# Patient Record
Sex: FEMALE | Race: WHITE | NOT HISPANIC OR LATINO | URBAN - METROPOLITAN AREA
[De-identification: names, ages, dates, MRNs, and addresses within clinical notes are randomized per-mention and may not be internally consistent; named-entity substitution may affect disease eponyms.]

---

## 2023-05-26 ENCOUNTER — APPOINTMENT (OUTPATIENT)
Dept: PRIMARY CARE | Facility: CLINIC | Age: 28
End: 2023-05-26
Payer: COMMERCIAL

## 2023-11-02 ENCOUNTER — TELEMEDICINE (OUTPATIENT)
Dept: PRIMARY CARE | Facility: CLINIC | Age: 28
End: 2023-11-02
Payer: COMMERCIAL

## 2023-11-02 DIAGNOSIS — U07.1 COVID: Primary | ICD-10-CM

## 2023-11-02 PROCEDURE — 99213 OFFICE O/P EST LOW 20 MIN: CPT | Performed by: FAMILY MEDICINE

## 2023-11-02 RX ORDER — BENZONATATE 100 MG/1
100 CAPSULE ORAL 3 TIMES DAILY PRN
Qty: 20 CAPSULE | Refills: 0 | Status: SHIPPED | OUTPATIENT
Start: 2023-11-02 | End: 2023-11-09

## 2023-11-02 NOTE — LETTER
November 2, 2023    Love MEYER Naty  [address]  [place] [address]      Dear Ms. Naty:          November 2, 2023      To Whom It May Concern:    Our patient, Love Alfonso, has been under our care and will need to be excused from 11/2/2023 to 11/7/2023    Their return to work date is:  11/8/2023      Sincerely,      Kellen Chavez DO    11/2/23                     Kellen Chavez DO        CC: No Recipients

## 2023-11-02 NOTE — PROGRESS NOTES
Assessment     ASSESSMENT/PLAN:      Problem List Items Addressed This Visit    None  Visit Diagnoses       COVID    -  Primary    Relevant Medications    benzonatate (Tessalon Perles) 100 mg capsule              Patient Instructions:  Patient Instructions   Patient does not have risk factors that would warrant treatment with paxlovid. Recommend supportive care. Try tessalon pearls for cough., Use NASIDs for pain.       Patient consented to virtual encounter. If patient was felt to need in-person evaluation they were directed to the office or ED as appropriate. Total time spent interacting with patient was [15] minutes.     Patient presents today via video telemedicine encounter. This encounter is not taking place in person due to COVID.    Signed by: Kellen Chavez DO       FUTURE DIRECTION:   ]    Subjective     SUBJECTIVE:     HPI : Patient is a 28 y.o. female who presents today via video telemedicine encounter to discuss the following:    Started yesterday with Sore throat congestion and cough , myalgia, subjective fever  Denies sick contact, but recently traveled via car to [place]   Denies chest pain or issues with breathing   Home COVID test was positive     Review of Systems  Negative unless stated above     Objective   OBJECTIVE:     There were no vitals filed for this visit.    Physical Exam  Constitutional:       Appearance: Normal appearance.   HENT:      Head: Normocephalic.   Pulmonary:      Effort: Pulmonary effort is normal.   Musculoskeletal:      Cervical back: Normal range of motion.   Neurological:      Mental Status: She is alert.   Psychiatric:         Mood and Affect: Mood normal.

## 2023-11-03 NOTE — PATIENT INSTRUCTIONS
Patient does not have risk factors that would warrant treatment with paxlovid. Recommend supportive care. Try tessalon pearls for cough., Use NASIDs for pain.

## 2024-03-06 ENCOUNTER — OFFICE VISIT (OUTPATIENT)
Dept: PRIMARY CARE | Facility: CLINIC | Age: 29
End: 2024-03-06
Payer: COMMERCIAL

## 2024-03-06 VITALS
DIASTOLIC BLOOD PRESSURE: 70 MMHG | OXYGEN SATURATION: 99 % | SYSTOLIC BLOOD PRESSURE: 102 MMHG | HEART RATE: 76 BPM | TEMPERATURE: 97.1 F | WEIGHT: 151 LBS

## 2024-03-06 DIAGNOSIS — M54.16 LUMBAR BACK PAIN WITH RADICULOPATHY AFFECTING RIGHT LOWER EXTREMITY: Primary | ICD-10-CM

## 2024-03-06 PROCEDURE — 99214 OFFICE O/P EST MOD 30 MIN: CPT | Performed by: FAMILY MEDICINE

## 2024-03-06 PROCEDURE — 1036F TOBACCO NON-USER: CPT | Performed by: FAMILY MEDICINE

## 2024-03-06 RX ORDER — NAPROXEN 500 MG/1
500 TABLET ORAL
Qty: 10 TABLET | Refills: 0 | Status: SHIPPED | OUTPATIENT
Start: 2024-03-06 | End: 2024-03-11

## 2024-03-06 RX ORDER — DROSPIRENONE AND ETHINYL ESTRADIOL 0.03MG-3MG
1 KIT ORAL
COMMUNITY
Start: 2016-12-19

## 2024-03-06 RX ORDER — SERTRALINE HYDROCHLORIDE 50 MG/1
125 TABLET, FILM COATED ORAL DAILY
COMMUNITY
Start: 2023-05-10

## 2024-03-06 RX ORDER — POLYETHYLENE GLYCOL 3350 17 G/17G
POWDER, FOR SOLUTION ORAL
COMMUNITY
Start: 2012-05-07

## 2024-03-06 RX ORDER — CETIRIZINE HYDROCHLORIDE 10 MG/1
10 TABLET ORAL DAILY
COMMUNITY

## 2024-03-06 RX ORDER — CYCLOBENZAPRINE HCL 5 MG
5 TABLET ORAL NIGHTLY PRN
Qty: 10 TABLET | Refills: 0 | Status: SHIPPED | OUTPATIENT
Start: 2024-03-06 | End: 2024-05-05

## 2024-03-06 ASSESSMENT — ENCOUNTER SYMPTOMS
NUMBNESS: 0
WEAKNESS: 0
BACK PAIN: 1

## 2024-03-06 NOTE — PROGRESS NOTES
"Subjective   Patient ID: Love Alfonso is a 29 y.o. female who presents for Back Pain (Lower back pain worsening x \" a while\" but severe x 1 day. NKI).    Love presents with low back pain. Has been a chronic issue but worse over last day. No accident or injury. Pain on right side. Radiates into right buttocks and leg. No weakness in leg. Has been doing stretches at home with limited improvement.          Review of Systems   Musculoskeletal:  Positive for back pain.   Neurological:  Negative for weakness and numbness.       Objective   /70   Pulse 76   Temp 36.2 °C (97.1 °F)   Wt 68.5 kg (151 lb)   SpO2 99%     Physical Exam  Constitutional:       General: She is not in acute distress.     Appearance: Normal appearance.   HENT:      Head: Normocephalic.   Pulmonary:      Effort: Pulmonary effort is normal.   Musculoskeletal:      Lumbar back: Tenderness (right QL) present. No swelling, deformity or bony tenderness. Negative right straight leg raise test and negative left straight leg raise test.   Skin:     General: Skin is warm and dry.   Neurological:      General: No focal deficit present.      Mental Status: She is alert.      Comments: +5/5 strength of bilateral lower extremities   Psychiatric:         Mood and Affect: Mood normal.         Assessment/Plan   Diagnoses and all orders for this visit:  Lumbar back pain with radiculopathy affecting right lower extremity  Comments:  Check XR. Refer to PT. Start cyclobenzaprine and naproxen.  Orders:  -     XR lumbar spine 2-3 views; Future  -     cyclobenzaprine (Flexeril) 5 mg tablet; Take 1 tablet (5 mg) by mouth as needed at bedtime for muscle spasms.  -     naproxen (Naprosyn) 500 mg tablet; Take 1 tablet (500 mg) by mouth 2 times a day with meals for 5 days.  -     Referral to Physical Therapy; Future         "

## 2024-03-20 ENCOUNTER — HOSPITAL ENCOUNTER (OUTPATIENT)
Dept: RADIOLOGY | Facility: HOSPITAL | Age: 29
Discharge: HOME | End: 2024-03-20
Payer: COMMERCIAL

## 2024-03-20 ENCOUNTER — EVALUATION (OUTPATIENT)
Dept: PHYSICAL THERAPY | Facility: HOSPITAL | Age: 29
End: 2024-03-20
Payer: COMMERCIAL

## 2024-03-20 DIAGNOSIS — M54.16 LUMBAR BACK PAIN WITH RADICULOPATHY AFFECTING RIGHT LOWER EXTREMITY: Primary | ICD-10-CM

## 2024-03-20 DIAGNOSIS — M54.16 LUMBAR BACK PAIN WITH RADICULOPATHY AFFECTING RIGHT LOWER EXTREMITY: ICD-10-CM

## 2024-03-20 PROCEDURE — 72100 X-RAY EXAM L-S SPINE 2/3 VWS: CPT

## 2024-03-20 PROCEDURE — 72100 X-RAY EXAM L-S SPINE 2/3 VWS: CPT | Performed by: RADIOLOGY

## 2024-03-20 PROCEDURE — 97110 THERAPEUTIC EXERCISES: CPT | Mod: GP | Performed by: PHYSICAL THERAPIST

## 2024-03-20 PROCEDURE — 97161 PT EVAL LOW COMPLEX 20 MIN: CPT | Mod: GP | Performed by: PHYSICAL THERAPIST

## 2024-03-20 ASSESSMENT — PATIENT HEALTH QUESTIONNAIRE - PHQ9
1. LITTLE INTEREST OR PLEASURE IN DOING THINGS: SEVERAL DAYS
10. IF YOU CHECKED OFF ANY PROBLEMS, HOW DIFFICULT HAVE THESE PROBLEMS MADE IT FOR YOU TO DO YOUR WORK, TAKE CARE OF THINGS AT HOME, OR GET ALONG WITH OTHER PEOPLE: NOT DIFFICULT AT ALL
2. FEELING DOWN, DEPRESSED OR HOPELESS: NOT AT ALL
SUM OF ALL RESPONSES TO PHQ9 QUESTIONS 1 AND 2: 1

## 2024-03-20 ASSESSMENT — PAIN - FUNCTIONAL ASSESSMENT: PAIN_FUNCTIONAL_ASSESSMENT: 0-10

## 2024-03-20 ASSESSMENT — PAIN SCALES - GENERAL: PAINLEVEL_OUTOF10: 4

## 2024-03-20 ASSESSMENT — PAIN DESCRIPTION - DESCRIPTORS: DESCRIPTORS: ACHING

## 2024-03-20 ASSESSMENT — ENCOUNTER SYMPTOMS
DEPRESSION: 1
OCCASIONAL FEELINGS OF UNSTEADINESS: 0
LOSS OF SENSATION IN FEET: 0

## 2024-03-20 NOTE — PROGRESS NOTES
Physical Therapy    Physical Therapy Evaluation    Patient Name: Love Alfonso  MRN: [id number]  Today's Date: 3/20/2024  Time Calculation  Start Time: 1030  Stop Time: 1130  Time Calculation (min): 60 min  Visit # 1  PT Evaluation Time Entry  PT Evaluation (Low) Time Entry: 50  PT Therapeutic Procedures Time Entry  Therapeutic Exercise Time Entry: 10               Assessment  Pt presents with low back pain, intermittent LE radiculopathy, decreased strength and flexibility in LE's and trunk and would benefit from continued skilled PT services.      Plan  continue 2x per week x 4 weeks for   Treatment/Interventions: Cryotherapy, Education/ Instruction, Electrical stimulation, Hot pack, Manual therapy, Mechanical traction, Neuromuscular re-education, Self care/ home management, Therapeutic activities, Therapeutic exercises, body mechanics education and postural re education    Current Problem  1. Lumbar back pain with radiculopathy affecting right lower extremity  Referral to Physical Therapy    Follow Up In Physical Therapy    Check XR. Refer to PT. Start cyclobenzaprine and naproxen.        Subjective  Insidiuous onset, about 2 years ago.has back pain that is constant but varies in intensity.  Beginning of March developed right leg pain, posterior to back of knee. No specidiv incident, was disrupting sleep, No weakness, no change in bowel or bladder habits. Went to PCP, she was given Excedrin, muscle relaxants but did not take. She does Yoga a couple times a week. Instense leg pain subsided a week later. She is back to low grade back, Constant movemnt and always uncomfortable. Walking tolerance is about 5 mins before pain increases. Standing tolerance limited, any prolong positioning.   Pt has history of IBS, ocular migraines, depression. She is being followed for this and depression is controlled with meds.   Precautions:  Precautions  STEADI Fall Risk Score (The score of 4 or more indicates an increased risk of  falling): 2  Precautions Comment: none  Pain:  Pain Assessment: 0-10  Pain Score: 4  Pain Location: Back  Pain Descriptors: Aching  Home Living  lives alone     Prior Function Per Pt/Caregiver Report:  works full time as a , not much lifting, 80 percent of job is sitting. She does have a standing desk.      Objective pt presents with hyperextension noted at elbows, knees, forward head and rounded shoulder posture     Range of Motion: FF EFL but pain end range, extension 50% at end range across mid low back, right side bending 50% and pain right low back, left side bending 40% and pain right low back,.       Strength: Bilateral LE are 5/5 major muscle groups except for hip abd are 4+/5      Palpation: tender bilateral L4-5 paraspinals, right worse than left, right piriformis, also very tender bilateral Upper traps, sub occipital muscles     Special Tests:    Seated and supine SLR tests are negative  Gait: WNL     Balance:  WFL     Stairs: reciprocal     Outcome Measures:  Other Measures  Oswestry Disablity Index (RENATE): 18%     OP EDUCATION: reviewed proper sitting posture, instructed in calf and hamstring stretches, discussed ergonomic set up of work station  EXERCISES       Date 3/20/2024        VISIT# # 1 # # #    REPS REPS REPS REPS   Seated hamstring stretch, HEP             Nu Step next      Treadmill              Swiss Ball     DKTC                           LTR                           Bridges                          Piriformis stretch              Shuttle    DLP                       SLP                       Heel raise/calf stretch       Clamshells       Planks       Parallel Bars          standing hip abd                                     Slow high knee march                                     Squats                                     Lunge                                                                                           HEP              Goals:  Active       PT Problem       PT Goal 1        Start:  03/20/24    Expected End:  06/20/24       Pt will be independent with HEP to further promote progression of strength, ROM, and endurance.          PT Goal 2       Start:  03/20/24    Expected End:  06/20/24       Increased walking and standing tolerance to 60 mins to allow increased mobility in the community and greater ease with household tasks          PT Goal 3       Start:  03/20/24    Expected End:  06/20/24       Increased bilateral LE affected muscle groups to 5/5 to allow greater ease with ADL's and household tasks          PT Goal 4       Start:  03/20/24    Expected End:  06/20/24       Increased lumbar spine AROM to WFL all planes to allow greater ease with ADL's and household tasks , job duties         PT Goal 5       Start:  03/20/24    Expected End:  06/20/24       Decreased pain in low back to 1-2/10 max with activity and eliminate LE radiculopathy to allow greater ease with work duties, household tasks and HEP

## 2024-03-26 ENCOUNTER — APPOINTMENT (OUTPATIENT)
Dept: PHYSICAL THERAPY | Facility: HOSPITAL | Age: 29
End: 2024-03-26
Payer: COMMERCIAL

## 2024-04-02 ENCOUNTER — TREATMENT (OUTPATIENT)
Dept: PHYSICAL THERAPY | Facility: HOSPITAL | Age: 29
End: 2024-04-02
Payer: COMMERCIAL

## 2024-04-02 DIAGNOSIS — M54.16 LUMBAR BACK PAIN WITH RADICULOPATHY AFFECTING RIGHT LOWER EXTREMITY: ICD-10-CM

## 2024-04-02 PROCEDURE — 97110 THERAPEUTIC EXERCISES: CPT | Mod: GP,CQ

## 2024-04-02 ASSESSMENT — PAIN SCALES - GENERAL: PAINLEVEL_OUTOF10: 2

## 2024-04-02 ASSESSMENT — PAIN - FUNCTIONAL ASSESSMENT: PAIN_FUNCTIONAL_ASSESSMENT: 0-10

## 2024-04-02 NOTE — PROGRESS NOTES
Physical Therapy    Physical Therapy Treatment    Patient Name: Love Alfonso  MRN: [id number]  : 1995   Today's Date: 2024  Time Calculation  Start Time: 227  Stop Time: 310  Time Calculation (min): 43 min  Visit Number:     Current Problem  Problem List Items Addressed This Visit             ICD-10-CM    Lumbar back pain with radiculopathy affecting right lower extremity M54.16        Subjective   General    Precautions  Precautions  Precautions Comment: none    Pain  Pain Assessment: 0-10  Pain Score: 2  Pain Location: Back      Objective        Treatments:     EXERCISES       Date 3/20/2024   2024     VISIT# # 1 #2 # #    REPS REPS REPS REPS   Seated hamstring stretch, HEP             Nu Step next L2 10min     Treadmill              Swiss Ball     DKTC  2x10                         LTR  2x10                         Bridges  x10                        Piriformis stretch              Shuttle    DLP                       SLP                       Heel raise/calf stretch       Clamshells       Planks       Parallel Bars          standing hip abd                                     Slow high knee march                                     Squats                                     Lunge                                     RB DF stretch   [id number]                                                                                   HEP  Ricardo HS stretch, Ricardo calf stretch, SKTC, LTR          Assessment:   Pt tolerated all exercises well with minimal difficulty reporting no change in pain throughout session. Pt demonstrates improved understanding of proper posture at end of session.     Plan:   Monitor pt response to session and progress as tolerated, continue to improve posture     Goals:  Active       PT Problem       PT Goal 1       Start:  24    Expected End:  24       Pt will be independent with HEP to further promote progression of strength, ROM, and endurance.          PT Goal 2        Start:  03/20/24    Expected End:  06/20/24       Increased walking and standing tolerance to 60 mins to allow increased mobility in the community and greater ease with household tasks          PT Goal 3       Start:  03/20/24    Expected End:  06/20/24       Increased bilateral LE affected muscle groups to 5/5 to allow greater ease with ADL's and household tasks          PT Goal 4       Start:  03/20/24    Expected End:  06/20/24       Increased lumbar spine AROM to WFL all planes to allow greater ease with ADL's and household tasks , job duties         PT Goal 5       Start:  03/20/24    Expected End:  06/20/24       Decreased pain in low back to 1-2/10 max with activity and eliminate LE radiculopathy to allow greater ease with work duties, household tasks and HEP

## 2024-04-04 ENCOUNTER — TREATMENT (OUTPATIENT)
Dept: PHYSICAL THERAPY | Facility: HOSPITAL | Age: 29
End: 2024-04-04
Payer: COMMERCIAL

## 2024-04-04 DIAGNOSIS — M54.16 LUMBAR BACK PAIN WITH RADICULOPATHY AFFECTING RIGHT LOWER EXTREMITY: Primary | ICD-10-CM

## 2024-04-04 PROCEDURE — 97110 THERAPEUTIC EXERCISES: CPT | Mod: GP,CQ

## 2024-04-04 ASSESSMENT — PAIN SCALES - GENERAL: PAINLEVEL_OUTOF10: 3

## 2024-04-04 ASSESSMENT — PAIN - FUNCTIONAL ASSESSMENT: PAIN_FUNCTIONAL_ASSESSMENT: 0-10

## 2024-04-04 NOTE — PROGRESS NOTES
Physical Therapy    Physical Therapy Treatment    Patient Name: Love Alfonso  MRN: [id number]  Today's Date: 4/4/2024  Time Calculation  Start Time: 1045  Stop Time: 1130  Time Calculation (min): 45 min    PT Therapeutic Procedures Time Entry  Therapeutic Exercise Time Entry: 45        VISIT# 3/25    Current Problem  1. Lumbar back pain with radiculopathy affecting right lower extremity  Follow Up In Physical Therapy    Check XR. Refer to PT. Start cyclobenzaprine and naproxen.          Subjective   Pt reports no increased pain but was pretty tired yesterday.  Pt will be going on vacation soon and she will be driving a long distance      Precautions  Precautions  STEADI Fall Risk Score (The score of 4 or more indicates an increased risk of falling): unchanged  Precautions Comment: none       Pain  Pain Assessment: 0-10  Pain Score: 3  Pain Location: Back       Objective   Educated patient on SKTC and LTR with a ball  Access Code: [email or web address]  URL: [email or web address]  Date: 04/04/2024  Prepared by: Bob Avila    Exercises  - Supine Single Knee to Chest Stretch  - 1-2 x daily - 5-7 x weekly - 2 sets - 10 reps  - Supine Lower Trunk Rotation  - 1-2 x daily - 5-7 x weekly - 2 sets - 10 reps  - Clamshell  - 1-2 x daily - 5-7 x weekly - 2 sets - 10 reps  - Seated Piriformis Stretch with Trunk Bend  - 1-2 x daily - 5-7 x weekly - 1 sets - 3 reps - 30 hold  - Standing Hamstring Stretch with Step  - 1-2 x daily - 5-7 x weekly - 1 sets - 3 reps - 30 hold  - Standing Gastroc Stretch on Step  - 1-2 x daily - 5-7 x weekly - 1 sets - 3 reps - 30 hold  Treatments:  EXERCISES       Date 3/20/2024   4/2/2024 4/4/24    VISIT# # 1 #2 #3 #    REPS REPS REPS REPS   Seated hamstring stretch, HEP             Nu Step next L2 10min 10' @L2    Treadmill              Swiss Ball     DKTC  2x10 SKTC 2 x 10                        LTR  2x10 2 x 10                         Bridges  x10                        Piriformis  "stretch   30\" x 3           Shuttle    DLP                       SLP                       Heel raise/calf stretch       Iman   2 x 10     Planks       Parallel Bars          standing hip abd                                     Slow high knee march                                     Squats                                     Lunge                                     RB DF stretch   [id number]                           HEP  Ricardo HS stretch, Ricardo calf stretch, SKTC, LTR  New HEP for traveling         Assessment:  Pt needed cues for core stability with DKTC and SKTC.  Gave pt a printed copy of her exercises that she can do on her road trip      Outpatient Education  Individual(s) Educated: Patient  Education Provided: Home Exercise Program  Risk and Benefits Discussed with Patient/Caregiver/Other: yes  Patient/Caregiver Demonstrated Understanding: yes  Plan of Care Discussed and Agreed Upon: yes  Patient Response to Education: Patient/Caregiver Verbalized Understanding of Information, Patient/Caregiver Performed Return Demonstration of Exercises/Activities, Patient/Caregiver Asked Appropriate Questions  Education Comment: Access Code: [email or web address]      Plan:Review exercises as needed before vacation  OP PT Plan  Treatment/Interventions: Cryotherapy, Education/ Instruction, Electrical stimulation, Hot pack, Manual therapy, Mechanical traction, Neuromuscular re-education, Self care/ home management, Therapeutic activities, Therapeutic exercises    Goals:  Active       PT Problem       PT Goal 1       Start:  03/20/24    Expected End:  06/20/24       Pt will be independent with HEP to further promote progression of strength, ROM, and endurance.          PT Goal 2       Start:  03/20/24    Expected End:  06/20/24       Increased walking and standing tolerance to 60 mins to allow increased mobility in the community and greater ease with household tasks          PT Goal 3       Start:  03/20/24    Expected End:  06/20/24       " Increased bilateral LE affected muscle groups to 5/5 to allow greater ease with ADL's and household tasks          PT Goal 4       Start:  03/20/24    Expected End:  06/20/24       Increased lumbar spine AROM to WFL all planes to allow greater ease with ADL's and household tasks , job duties         PT Goal 5       Start:  03/20/24    Expected End:  06/20/24       Decreased pain in low back to 1-2/10 max with activity and eliminate LE radiculopathy to allow greater ease with work duties, household tasks and HEP

## 2024-04-09 ENCOUNTER — TREATMENT (OUTPATIENT)
Dept: PHYSICAL THERAPY | Facility: HOSPITAL | Age: 29
End: 2024-04-09
Payer: COMMERCIAL

## 2024-04-09 DIAGNOSIS — M54.16 LUMBAR BACK PAIN WITH RADICULOPATHY AFFECTING RIGHT LOWER EXTREMITY: Primary | ICD-10-CM

## 2024-04-09 PROCEDURE — 97110 THERAPEUTIC EXERCISES: CPT | Mod: GP,CQ

## 2024-04-09 ASSESSMENT — PAIN SCALES - GENERAL: PAINLEVEL_OUTOF10: 3

## 2024-04-09 ASSESSMENT — PAIN - FUNCTIONAL ASSESSMENT: PAIN_FUNCTIONAL_ASSESSMENT: 0-10

## 2024-04-09 NOTE — PROGRESS NOTES
"Physical Therapy    Physical Therapy Treatment    Patient Name: Love Alfonso  MRN: [id number]  : 1995   Today's Date: 2024  Time Calculation  Start Time: 931  Stop Time: 0  Time Calculation (min): 49 min  Visit Number:     Current Problem  Problem List Items Addressed This Visit             ICD-10-CM    Lumbar back pain with radiculopathy affecting right lower extremity - Primary M54.16        Subjective   General  Pt states she is experiencing more of a muscle soreness rather than a pain. Pt states she no longer has radicular pain into her R leg.   Precautions  Precautions  Precautions Comment: none    Pain  Pain Assessment: 0-10  Pain Score: 3  Pain Location: Back      Objective   Note provided by Elena WALLS for work regarding supportive footwear      Treatments:     EXERCISES       Date 3/20/2024   2024 4/4/24 2024   VISIT# # 1 #2 #3 #4    REPS REPS REPS REPS   Seated hamstring stretch, HEP             Nu Step next L2 10min 10' @L2 L3 10min   Treadmill              Swiss Ball     DKTC  2x10 SKTC 2 x 10 3x10ea                        LTR  2x10 2 x 10  3x10ea                        Bridges  x10  X10 (Pn free range)      Piriformis stretch   30\" x 3 [id number] Ricardo       SKTC          Modified georgina stretch           PPT with marches           PPT 90/90 heel taps        Shuttle    DLP                       SLP                       Heel raise/calf stretch       Clamshells   2 x 10  [id number]    Planks       Parallel Bars          standing hip abd                                     Slow high knee march                                     Squats                                     Lunge                                     RB DF stretch   [id number]                           HEP  Ricardo HS stretch, Ricardo calf stretch, SKTC, LTR  New HEP for traveling       Assessment:   Pt tolerated all exercises well with minimal difficulty reporting no increase in pain throughout session. Pt verbalized good understanding " of HEP to continue with on her vacation.     Plan:   Pt to return to therapy on 4/18 from vacation, progress with DLS exercises as tolerated     Goals:  Active       PT Problem       PT Goal 1       Start:  03/20/24    Expected End:  06/20/24       Pt will be independent with HEP to further promote progression of strength, ROM, and endurance.          PT Goal 2       Start:  03/20/24    Expected End:  06/20/24       Increased walking and standing tolerance to 60 mins to allow increased mobility in the community and greater ease with household tasks          PT Goal 3       Start:  03/20/24    Expected End:  06/20/24       Increased bilateral LE affected muscle groups to 5/5 to allow greater ease with ADL's and household tasks          PT Goal 4       Start:  03/20/24    Expected End:  06/20/24       Increased lumbar spine AROM to WFL all planes to allow greater ease with ADL's and household tasks , job duties         PT Goal 5       Start:  03/20/24    Expected End:  06/20/24       Decreased pain in low back to 1-2/10 max with activity and eliminate LE radiculopathy to allow greater ease with work duties, household tasks and HEP

## 2024-04-18 ENCOUNTER — APPOINTMENT (OUTPATIENT)
Dept: PHYSICAL THERAPY | Facility: HOSPITAL | Age: 29
End: 2024-04-18
Payer: COMMERCIAL

## 2024-04-24 ENCOUNTER — TREATMENT (OUTPATIENT)
Dept: PHYSICAL THERAPY | Facility: HOSPITAL | Age: 29
End: 2024-04-24
Payer: COMMERCIAL

## 2024-04-24 DIAGNOSIS — M54.16 LUMBAR BACK PAIN WITH RADICULOPATHY AFFECTING RIGHT LOWER EXTREMITY: ICD-10-CM

## 2024-04-24 PROCEDURE — 97110 THERAPEUTIC EXERCISES: CPT | Mod: GP | Performed by: PHYSICAL THERAPIST

## 2024-04-24 ASSESSMENT — PAIN - FUNCTIONAL ASSESSMENT: PAIN_FUNCTIONAL_ASSESSMENT: 0-10

## 2024-04-24 ASSESSMENT — PAIN SCALES - GENERAL: PAINLEVEL_OUTOF10: 3

## 2024-04-24 NOTE — PROGRESS NOTES
Physical Therapy    Physical Therapy Treatment    Patient Name: Love Alfonso  MRN: [id number]  Today's Date: 4/24/2024  Visit #5  Time Calculation  Start Time: 1035  Stop Time: 1115  Time Calculation (min): 40 min     PT Therapeutic Procedures Time Entry  Therapeutic Exercise Time Entry: 40                   Assessment: Pt needs encouragement to do HEP with more consistency. She has made gains with posture, body mechanics and work set up ergonomics. She still complains of low back pain morning and evening, and would benefit from continued skilled PT services     Plan: continue PT 1-2 x per week. Assess response to new there ex, focus on core strength and dynamic lumbar stabilization.       Current Problem  1. Lumbar back pain with radiculopathy affecting right lower extremity  Follow Up In Physical Therapy    Check XR. Refer to PT. Start cyclobenzaprine and naproxen.              Subjective  Back pain has not changed much, but LE radiculopathy has disappeared. She feels like her posture has improved. She states pain is bad in the morning, and also at end of day, Pain does wake her from sleep, she has been trying to sleep on her back, but that makes her feel nauseous. Work job duties are ok, she has been able to adjust her chair and desk set up . Her pain disappates once up and moving around, but returns by the end of her work day, up to a 4/10.   Precautions  Precautions  Precautions Comment: none  Pain  Pain Assessment  Pain Assessment: 0-10  Pain Score: 3    Objective      Treatments:  EXERCISES        Date 3/20/2024   4/2/2024 4/4/24 4/9/2024 4/24/2024     VISIT# # 1 #2 #3 #4 #5    REPS REPS REPS REPS REPS   Seated hamstring stretch, HEP               Nu Step next L2 10min 10' @L2 L3 10min L3 10   Treadmill                Swiss Ball     DKTC  2x10 SKTC 2 x 10 3x10ea  20x                       LTR  2x10 2 x 10  3x10ea  20x each way                       Bridges  x10  X10 (Pn free range) 10 x with ppt hold       "Piriformis stretch   30\" x 3 [id number] Ricardo  3x ricardo, 30 sec hold      SKTC           Modified georgina stretch            PPT with marches            PPT 90/90 heel taps      next   Shuttle    DLP                        SLP                        Heel raise/calf stretch        Iman   2 x 10  [id number]     Planks     10 sec hold x 3   Side planks     10 sec hold x 3 ricardo   Parallel Bars          standing hip abd                                      Slow high knee march                                      Squats                                      Lunge                                      RB DF stretch   [id number]       Seated edge of bed with shoulders flexed to 90, holding ppt and marching slowly     20 x   Theraband     lat pull     Red 20x                          Mid rows     Red 20x                         Chops      Blue 20x ricardo   HEP  Ricardo HS stretch, Ricardo calf stretch, SKTC, LTR  New HEP for traveling        OP EDUCATION: issued red and blue theraband for HEP     Goals:  Active       PT Problem       PT Goal 1       Start:  03/20/24    Expected End:  06/20/24       Pt will be independent with HEP to further promote progression of strength, ROM, and endurance.          PT Goal 2       Start:  03/20/24    Expected End:  06/20/24       Increased walking and standing tolerance to 60 mins to allow increased mobility in the community and greater ease with household tasks          PT Goal 3       Start:  03/20/24    Expected End:  06/20/24       Increased bilateral LE affected muscle groups to 5/5 to allow greater ease with ADL's and household tasks          PT Goal 4       Start:  03/20/24    Expected End:  06/20/24       Increased lumbar spine AROM to WFL all planes to allow greater ease with ADL's and household tasks , job duties         PT Goal 5       Start:  03/20/24    Expected End:  06/20/24       Decreased pain in low back to 1-2/10 max with activity and eliminate LE radiculopathy to allow greater ease with " work duties, household tasks and HEP

## 2024-04-26 ENCOUNTER — TREATMENT (OUTPATIENT)
Dept: PHYSICAL THERAPY | Facility: HOSPITAL | Age: 29
End: 2024-04-26
Payer: COMMERCIAL

## 2024-04-26 DIAGNOSIS — M54.16 LUMBAR BACK PAIN WITH RADICULOPATHY AFFECTING RIGHT LOWER EXTREMITY: ICD-10-CM

## 2024-04-26 PROCEDURE — 97110 THERAPEUTIC EXERCISES: CPT | Mod: GP,CQ

## 2024-04-26 ASSESSMENT — PAIN - FUNCTIONAL ASSESSMENT: PAIN_FUNCTIONAL_ASSESSMENT: 0-10

## 2024-04-26 ASSESSMENT — PAIN SCALES - GENERAL: PAINLEVEL_OUTOF10: 2

## 2024-04-26 NOTE — PROGRESS NOTES
"Physical Therapy    Physical Therapy Treatment    Patient Name: Love Alfonso  MRN: [id number]  : 1995   Today's Date: 2024  Time Calculation  Start Time: 1030  Stop Time: 1110  Time Calculation (min): 40 min     PT Therapeutic Procedures Time Entry  Therapeutic Exercise Time Entry: 40                 Visit Number:     Current Problem  Problem List Items Addressed This Visit             ICD-10-CM    Lumbar back pain with radiculopathy affecting right lower extremity M54.16        Subjective   General  Pt states her work excepted our note stating she should wear supportive shoes and she has began wearing tennis shoes to work.   Precautions  Precautions  Precautions Comment: none    Pain  Pain Assessment: 0-10  Pain Score: 2      Objective    Pt demonstrates improved seated and standing posture this date     Outcome Measures:  Other Measures  Oswestry Disablity Index (RENATE): 26%    Treatments:     EXERCISES       Date 24   VISIT# #3 #4 #5 #6    REPS REPS REPS    Seated hamstring stretch,              Nu Step 10' @L2 L3 10min L3 10 L3 10min   Treadmill              Swiss Ball     DKTC SKTC 2 x 10 3x10ea  20x                        LTR 2 x 10  3x10ea  20x each way                        Bridges  X10 (Pn free range) 10 x with ppt hold       Piriformis stretch 30\" x 3 [id number] Lacey  3x lacey, 30 sec hold 30sec x3      SKTC          Modified georgina stretch           PPT with marches           PPT 90/90 heel taps    next [id number]    Shuttle    DLP                       SLP                       Heel raise/calf stretch       Clamshells 2 x 10  [id number]      Planks   10 sec hold x 3 10sec x1   Side planks   10 sec hold x 3 lacey 5sec x2ea    Parallel Bars          standing hip abd                                     Slow high knee march                                     Squats                                     Lunge                                     RB DF stretch        Seated " edge of bed with shoulders flexed to 90, holding ppt and marching slowly   20 x    Theraband     lat pull   Red 20x Red 2x10                          Mid rows   Red 20x Red 2x10                         Chops    Blue 20x lacey Red 2x10                         Paloff press    Red 2x10   HEP New HEP for traveling       OP Education:  Access Code: [email or web address]  URL: [email or web address]  Date: 04/26/2024  Prepared by: Lata Hicks    Exercises  - Side Plank on Elbow  - 7 x weekly - 3 sets - 5sec  hold  - Standard Plank  - 7 x weekly - 3 sets - 10sec hold  - Standing Tricep Extensions with Resistance  - 7 x weekly - 2 sets - 10 reps  - Standing Shoulder Row with Anchored Resistance  - 7 x weekly - 2 sets - 10 reps  - Standing Diagonal Chop  - 7 x weekly - 2 sets - 10 reps  - Standing Anti-Rotation Press with Anchored Resistance  - 7 x weekly - 2 sets - 10 reps    Assessment:   Pt tolerated all exercises well with minimal difficulty requiring intermittent verbal cueing throughout for proper form.     Plan:   Continue to improve core strength for decreased low back pain.     Goals:  Active       PT Problem       PT Goal 1       Start:  03/20/24    Expected End:  06/20/24       Pt will be independent with HEP to further promote progression of strength, ROM, and endurance.          PT Goal 2       Start:  03/20/24    Expected End:  06/20/24       Increased walking and standing tolerance to 60 mins to allow increased mobility in the community and greater ease with household tasks          PT Goal 3       Start:  03/20/24    Expected End:  06/20/24       Increased bilateral LE affected muscle groups to 5/5 to allow greater ease with ADL's and household tasks          PT Goal 4       Start:  03/20/24    Expected End:  06/20/24       Increased lumbar spine AROM to WFL all planes to allow greater ease with ADL's and household tasks , job duties         PT Goal 5       Start:  03/20/24    Expected End:  06/20/24        Decreased pain in low back to 1-2/10 max with activity and eliminate LE radiculopathy to allow greater ease with work duties, household tasks and HEP

## 2024-04-30 ENCOUNTER — TREATMENT (OUTPATIENT)
Dept: PHYSICAL THERAPY | Facility: HOSPITAL | Age: 29
End: 2024-04-30
Payer: COMMERCIAL

## 2024-04-30 DIAGNOSIS — M54.16 LUMBAR BACK PAIN WITH RADICULOPATHY AFFECTING RIGHT LOWER EXTREMITY: ICD-10-CM

## 2024-04-30 PROCEDURE — 97110 THERAPEUTIC EXERCISES: CPT | Mod: GP | Performed by: PHYSICAL THERAPIST

## 2024-04-30 PROCEDURE — 97530 THERAPEUTIC ACTIVITIES: CPT | Mod: GP | Performed by: PHYSICAL THERAPIST

## 2024-04-30 PROCEDURE — 97535 SELF CARE MNGMENT TRAINING: CPT | Mod: GP | Performed by: PHYSICAL THERAPIST

## 2024-04-30 ASSESSMENT — PAIN SCALES - GENERAL: PAINLEVEL_OUTOF10: 1

## 2024-04-30 ASSESSMENT — PAIN - FUNCTIONAL ASSESSMENT: PAIN_FUNCTIONAL_ASSESSMENT: 0-10

## 2024-04-30 NOTE — PROGRESS NOTES
Physical Therapy    Physical Therapy Treatment    Patient Name: Love Alfonso  MRN: [id number]  : 1995   Today's Date: 2024  Time Calculation  Start Time: 1030  Stop Time: 1114  Time Calculation (min): 44 min  PT Therapeutic Procedures Time Entry  Therapeutic Exercise Time Entry: 15  Therapeutic Activity Time Entry: 15  Self-Care/Home Mgmt Trainin           Visit Number:   Auth: 25 visits    Current Problem  Problem List Items Addressed This Visit             ICD-10-CM    Lumbar back pain with radiculopathy affecting right lower extremity M54.16        Subjective   Patient reports slightly improved overall. She no longer has pain down her right leg, but continues to have pain in the low back, especially with long periods of standing, walking, and when sleeping.     Precautions  Precautions  Precautions Comment: none    Pain  Pain Assessment: 0-10  Pain Score: 1  Pain Location: Back    Objective   Spine ROM Right (Degrees) Left (Degrees)   Side Bend 30 30   Rotation 80 80    (Degrees)   Flexion 110   Extension 8 p!     LE MMT (5/5 unless noted) Right Left   Hip Flexion     Hip ABD     Hip ADD     Knee Extension     Knee Flexion     Ankle DF 4-/5 4/5   Ankle EV 4-/5 4+/5        Outcome Measures:  Other Measures  Oswestry Disablity Index (RENATE): 26% (Patient stated no change since 24)    Treatments:  EXERCISES      Date 2024   VISIT# #5 #6 #7    REPS  Reassessment   Seated hamstring stretch,            Posture/Positioning/Work Sit   14'         Repeated Press Up (over pillow)   2x10   Seated Anterior Pelvic Tilt   X10 (Vcs needed)         Nu Step L3 10 L3 10min NT   Treadmill            Swiss Ball     DKTC 20x                         LTR 20x each way                         Bridges 10 x with ppt hold        Piriformis stretch 3x lacey, 30 sec hold 30sec x3       SKTC         Modified georgina stretch          PPT with marches          PPT 90/90 heel taps  next [id number]      Shuttle    DLP                      SLP                      Heel raise/calf stretch      Clamshells      Planks 10 sec hold x 3 10sec x1    Side planks 10 sec hold x 3 lacey 5sec x2ea     Parallel Bars          standing hip abd                                    Slow high knee march                                    Squats                                    Lunge                                    RB DF stretch       Seated edge of bed with shoulders flexed to 90, holding ppt and marching slowly 20 x     Theraband     lat pull Red 20x Red 2x10                           Mid rows Red 20x Red 2x10                          Chops  Blue 20x lacey Red 2x10                          Paloff press  Red 2x10    HEP   Reviewed - Added     Access Code: [email or web address]  URL: [email or web address]  Date: 04/30/2024  Prepared by: Efrain Bailey    Exercises  - Lying Prone with 1 Pillow  - 2 x daily - 2 sets - 10 reps - 1 sec hold  - Prone Press Up On Elbows  - 2 x daily - 2 sets - 10 reps - 1 sec hold  - Prone Press Up  - 2 x daily - 2 sets - 10 reps - 1 sec hold  - Seated Anterior Pelvic Tilt  - 3 x daily - 2 sets - 10 reps - 1 sec hold    Emphasis on neutral spine, Gustavo lumbar extension for disc health, current HEP for stretching and core stabilization.     Assessment:   Patient demonstrated mild-moderate back pain, limited lumbar extension with pain, and mild ankle weakness of DF bilaterally and EV on the right that improved with gentle repeated lumbar extension today.     Plan:    PRN 1 month while patient works on independent HEP with today's additions incorporated. Patient to contact office and schedule reassessment with Elena Pathak or Orlando Bailey if needed for symptom management or lack of progression. Plan to discharge in 1 month if no further skilled PT is needed.    OP PT Plan  Treatment/Interventions: Education/ Instruction, Neuromuscular re-education, Self care/ home management, Therapeutic  activities, Therapeutic exercises  PT Plan: Other (Comment), Skilled PT (PRN 1 month)  PT Frequency:  (PRN)  Duration: 1 month  Rehab Potential: Excellent  Plan of Care Agreement: Patient    Goals:  Active       PT Problem       PT Goal 1 (Met)       Start:  03/20/24    Expected End:  06/20/24    Resolved:  04/30/24    Pt will be independent with HEP to further promote progression of strength, ROM, and endurance.          PT Goal 2 (Progressing)       Start:  03/20/24    Expected End:  06/20/24       Increased walking and standing tolerance to 60 mins to allow increased mobility in the community and greater ease with household tasks          PT Goal 3 (Progressing)       Start:  03/20/24    Expected End:  06/20/24       Increased bilateral LE affected muscle groups to 5/5 to allow greater ease with ADL's and household tasks          PT Goal 4 (Progressing)       Start:  03/20/24    Expected End:  06/20/24       Increased lumbar spine AROM to WFL all planes to allow greater ease with ADL's and household tasks , job duties         PT Goal 5 (Progressing)       Start:  03/20/24    Expected End:  06/20/24       Decreased pain in low back to 1-2/10 max with activity and eliminate LE radiculopathy to allow greater ease with work duties, household tasks and HEP

## 2024-06-27 ENCOUNTER — DOCUMENTATION (OUTPATIENT)
Dept: PHYSICAL THERAPY | Facility: HOSPITAL | Age: 29
End: 2024-06-27
Payer: COMMERCIAL

## 2024-06-27 NOTE — PROGRESS NOTES
Physical Therapy    Discharge Summary    Name: Love Alfonso  MRN: [id number]  : 1995  Date: 24    Discharge Summary: PT    Discharge Information: Date of discharge 2024    Discharge Status: Patient has been on hold for 1 month while performing an independent HEP. No further skilled intervention indicated.     Rehab Discharge Reason: Achieved all and/or the most significant goals(s)

## 2024-07-03 ENCOUNTER — OFFICE VISIT (OUTPATIENT)
Dept: PRIMARY CARE | Facility: CLINIC | Age: 29
End: 2024-07-03
Payer: COMMERCIAL

## 2024-07-03 ENCOUNTER — LAB (OUTPATIENT)
Dept: LAB | Facility: LAB | Age: 29
End: 2024-07-03
Payer: COMMERCIAL

## 2024-07-03 VITALS
OXYGEN SATURATION: 99 % | SYSTOLIC BLOOD PRESSURE: 94 MMHG | TEMPERATURE: 97.9 F | HEART RATE: 77 BPM | DIASTOLIC BLOOD PRESSURE: 62 MMHG

## 2024-07-03 DIAGNOSIS — K29.00 ACUTE GASTRITIS WITHOUT HEMORRHAGE, UNSPECIFIED GASTRITIS TYPE: Primary | ICD-10-CM

## 2024-07-03 DIAGNOSIS — R11.2 NAUSEA AND VOMITING, UNSPECIFIED VOMITING TYPE: ICD-10-CM

## 2024-07-03 DIAGNOSIS — J01.80 OTHER SUBACUTE SINUSITIS: ICD-10-CM

## 2024-07-03 DIAGNOSIS — R35.0 URINARY FREQUENCY: ICD-10-CM

## 2024-07-03 LAB
ALBUMIN SERPL BCP-MCNC: 3.8 G/DL (ref 3.4–5)
ALP SERPL-CCNC: 54 U/L (ref 33–110)
ALT SERPL W P-5'-P-CCNC: 12 U/L (ref 7–45)
ANION GAP SERPL CALC-SCNC: 11 MMOL/L (ref 10–20)
AST SERPL W P-5'-P-CCNC: 17 U/L (ref 9–39)
BASOPHILS # BLD AUTO: 0.03 X10*3/UL (ref 0–0.1)
BASOPHILS NFR BLD AUTO: 0.4 %
BILIRUB SERPL-MCNC: 0.2 MG/DL (ref 0–1.2)
BUN SERPL-MCNC: 9 MG/DL (ref 6–23)
CALCIUM SERPL-MCNC: 9.1 MG/DL (ref 8.6–10.3)
CHLORIDE SERPL-SCNC: 107 MMOL/L (ref 98–107)
CO2 SERPL-SCNC: 25 MMOL/L (ref 21–32)
CREAT SERPL-MCNC: 0.65 MG/DL (ref 0.5–1.05)
EGFRCR SERPLBLD CKD-EPI 2021: >90 ML/MIN/1.73M*2
EOSINOPHIL # BLD AUTO: 0.21 X10*3/UL (ref 0–0.7)
EOSINOPHIL NFR BLD AUTO: 2.8 %
ERYTHROCYTE [DISTWIDTH] IN BLOOD BY AUTOMATED COUNT: 12.8 % (ref 11.5–14.5)
GLUCOSE SERPL-MCNC: 80 MG/DL (ref 74–99)
HCT VFR BLD AUTO: 41.7 % (ref 36–46)
HGB BLD-MCNC: 13.5 G/DL (ref 12–16)
IMM GRANULOCYTES # BLD AUTO: 0.02 X10*3/UL (ref 0–0.7)
IMM GRANULOCYTES NFR BLD AUTO: 0.3 % (ref 0–0.9)
LIPASE SERPL-CCNC: 33 U/L (ref 9–82)
LYMPHOCYTES # BLD AUTO: 2.28 X10*3/UL (ref 1.2–4.8)
LYMPHOCYTES NFR BLD AUTO: 30.3 %
MCH RBC QN AUTO: 29.3 PG (ref 26–34)
MCHC RBC AUTO-ENTMCNC: 32.4 G/DL (ref 32–36)
MCV RBC AUTO: 91 FL (ref 80–100)
MONOCYTES # BLD AUTO: 0.56 X10*3/UL (ref 0.1–1)
MONOCYTES NFR BLD AUTO: 7.4 %
NEUTROPHILS # BLD AUTO: 4.43 X10*3/UL (ref 1.2–7.7)
NEUTROPHILS NFR BLD AUTO: 58.8 %
NRBC BLD-RTO: 0 /100 WBCS (ref 0–0)
PLATELET # BLD AUTO: 237 X10*3/UL (ref 150–450)
POC APPEARANCE, URINE: CLEAR
POC BILIRUBIN, URINE: NEGATIVE
POC BLOOD, URINE: NEGATIVE
POC COLOR, URINE: YELLOW
POC GLUCOSE, URINE: NEGATIVE MG/DL
POC KETONES, URINE: NEGATIVE MG/DL
POC LEUKOCYTES, URINE: NEGATIVE
POC NITRITE,URINE: NEGATIVE
POC PH, URINE: 6 PH
POC PROTEIN, URINE: NEGATIVE MG/DL
POC SPECIFIC GRAVITY, URINE: 1.02
POC UROBILINOGEN, URINE: 0.2 EU/DL
POTASSIUM SERPL-SCNC: 4.9 MMOL/L (ref 3.5–5.3)
PROT SERPL-MCNC: 6.6 G/DL (ref 6.4–8.2)
RBC # BLD AUTO: 4.61 X10*6/UL (ref 4–5.2)
SODIUM SERPL-SCNC: 138 MMOL/L (ref 136–145)
WBC # BLD AUTO: 7.5 X10*3/UL (ref 4.4–11.3)

## 2024-07-03 PROCEDURE — 80053 COMPREHEN METABOLIC PANEL: CPT

## 2024-07-03 PROCEDURE — 81003 URINALYSIS AUTO W/O SCOPE: CPT | Performed by: FAMILY MEDICINE

## 2024-07-03 PROCEDURE — 36415 COLL VENOUS BLD VENIPUNCTURE: CPT

## 2024-07-03 PROCEDURE — 83690 ASSAY OF LIPASE: CPT

## 2024-07-03 PROCEDURE — 99214 OFFICE O/P EST MOD 30 MIN: CPT | Performed by: FAMILY MEDICINE

## 2024-07-03 PROCEDURE — 85025 COMPLETE CBC W/AUTO DIFF WBC: CPT

## 2024-07-03 PROCEDURE — 1036F TOBACCO NON-USER: CPT | Performed by: FAMILY MEDICINE

## 2024-07-03 RX ORDER — PROCHLORPERAZINE MALEATE 10 MG
10 TABLET ORAL 3 TIMES DAILY PRN
Qty: 20 TABLET | Refills: 0 | Status: SHIPPED | OUTPATIENT
Start: 2024-07-03 | End: 2024-09-01

## 2024-07-03 RX ORDER — OMEPRAZOLE 20 MG/1
20 CAPSULE, DELAYED RELEASE ORAL DAILY
Qty: 14 CAPSULE | Refills: 0 | Status: SHIPPED | OUTPATIENT
Start: 2024-07-03 | End: 2024-07-17

## 2024-07-03 RX ORDER — DOXYCYCLINE 100 MG/1
100 CAPSULE ORAL 2 TIMES DAILY
Qty: 10 CAPSULE | Refills: 0 | Status: SHIPPED | OUTPATIENT
Start: 2024-07-03 | End: 2024-07-08

## 2024-07-03 ASSESSMENT — ENCOUNTER SYMPTOMS
DIARRHEA: 1
ABDOMINAL PAIN: 0
FREQUENCY: 1
SINUS PRESSURE: 1
VOMITING: 1
FATIGUE: 1
TROUBLE SWALLOWING: 0
FEVER: 0
NAUSEA: 1

## 2024-07-03 NOTE — LETTER
July 3, 2024     Patient: Love Alfonso   YOB: 1995   Date of Visit: 7/3/2024       To Whom It May Concern:    Love Alfonso was seen in my clinic on 7/3/2024 at 9:30 am. Please excuse Love for her absence from work 7/1/24-7/5/24.         Sincerely,         Citlali Chowdhury, DO

## 2024-07-03 NOTE — PROGRESS NOTES
Subjective   Patient ID: Love Alfonso is a 29 y.o. female who presents for Vomiting (Discuss severe nausea causing vomiting and dry heaving x2 weeks. NKI ).    Love initially was sick at the beginning of June.  At that time she had a fever cough sore throat aches fatigue and sneezing.  Her COVID testing was negative.  She went to work after a few days as her symptoms improved.  She did continue to feel it.  Went to urgent care and was tested for mono which was negative.  She was diagnosed with a sinus affection and given a prescription for amoxicillin.  She tried taking a few doses of the medication but vomited.    Over the last 2 weeks she has had nausea and vomiting.  Symptoms are worse when driving, lying flat, or very hungry.  This time she is vomiting approximately 2-3 times per day.  She has woken up in middle of the night to vomit.  She has not had any abdominal pain.  Appetite is decreased.  She has more acid reflux than usual. LMP was 6 months ago when took a break from continuous OCP. She is only sexually active with women.          Review of Systems   Constitutional:  Positive for fatigue. Negative for fever.   HENT:  Positive for congestion and sinus pressure. Negative for trouble swallowing.    Gastrointestinal:  Positive for diarrhea, nausea and vomiting. Negative for abdominal pain.   Genitourinary:  Positive for frequency.       Objective   BP 94/62   Pulse 77   Temp 36.6 °C (97.9 °F)   SpO2 99%     Physical Exam  Constitutional:       Appearance: She is ill-appearing. She is not toxic-appearing.   HENT:      Head: Normocephalic and atraumatic.      Right Ear: Tympanic membrane normal.      Left Ear: Tympanic membrane normal.      Nose: Congestion present.      Mouth/Throat:      Pharynx: No oropharyngeal exudate or posterior oropharyngeal erythema.   Eyes:      Extraocular Movements: Extraocular movements intact.   Cardiovascular:      Rate and Rhythm: Normal rate and regular rhythm.    Pulmonary:      Effort: Pulmonary effort is normal.      Breath sounds: Normal breath sounds. No wheezing or rhonchi.   Abdominal:      General: Bowel sounds are normal.      Palpations: Abdomen is soft.      Tenderness: There is no abdominal tenderness. There is no guarding or rebound.   Musculoskeletal:      Cervical back: Neck supple.   Skin:     General: Skin is warm and dry.      Coloration: Skin is pale.   Neurological:      Mental Status: She is alert.      Cranial Nerves: No cranial nerve deficit.      Motor: No weakness.      Gait: Gait normal.   Psychiatric:         Mood and Affect: Mood normal.         Assessment/Plan   Diagnoses and all orders for this visit:  Acute gastritis without hemorrhage, unspecified gastritis type  Comments:  Symptoms possibly due to gastritis.  Recommend 2-week course of PPI. If symptoms persist, plan GI referral for EGD.  Orders:  -     omeprazole (PriLOSEC) 20 mg DR capsule; Take 1 capsule (20 mg) by mouth once daily for 14 days. Take 30 minutes before a meal.  Nausea and vomiting, unspecified vomiting type  Comments:  Check labs to evaluate. Patient did not tolerate Zofran in the past.  Start Compazine as needed.  If unable to keep anything down, recommend ER visit.  Orders:  -     prochlorperazine (Compazine) 10 mg tablet; Take 1 tablet (10 mg) by mouth 3 times a day as needed for nausea.  -     Comprehensive Metabolic Panel; Future  -     CBC and Auto Differential; Future  -     Lipase; Future  Urinary frequency  Comments:  Urine testing normal.  Orders:  -     POCT UA Automated manually resulted  Other subacute sinusitis  Comments:  Start doxycycline.  Orders:  -     doxycycline (Vibramycin) 100 mg capsule; Take 1 capsule (100 mg) by mouth 2 times a day for 5 days. Take with at least 8 ounces (large glass) of water, do not lie down for 30 minutes after

## 2024-07-19 ENCOUNTER — OFFICE VISIT (OUTPATIENT)
Dept: PRIMARY CARE | Facility: CLINIC | Age: 29
End: 2024-07-19
Payer: COMMERCIAL

## 2024-07-19 VITALS
DIASTOLIC BLOOD PRESSURE: 68 MMHG | OXYGEN SATURATION: 99 % | HEART RATE: 82 BPM | SYSTOLIC BLOOD PRESSURE: 116 MMHG | TEMPERATURE: 97.4 F

## 2024-07-19 DIAGNOSIS — R11.2 NAUSEA AND VOMITING, UNSPECIFIED VOMITING TYPE: ICD-10-CM

## 2024-07-19 DIAGNOSIS — K20.90 ESOPHAGITIS: ICD-10-CM

## 2024-07-19 DIAGNOSIS — J01.01 ACUTE RECURRENT MAXILLARY SINUSITIS: Primary | ICD-10-CM

## 2024-07-19 PROBLEM — F33.1 MAJOR DEPRESSIVE DISORDER, RECURRENT EPISODE, MODERATE (MULTI): Status: ACTIVE | Noted: 2022-02-24

## 2024-07-19 PROBLEM — J30.9 ALLERGIC RHINITIS: Status: ACTIVE | Noted: 2024-07-19

## 2024-07-19 PROCEDURE — 99214 OFFICE O/P EST MOD 30 MIN: CPT | Performed by: FAMILY MEDICINE

## 2024-07-19 PROCEDURE — 1036F TOBACCO NON-USER: CPT | Performed by: FAMILY MEDICINE

## 2024-07-19 RX ORDER — OMEPRAZOLE 20 MG/1
20 CAPSULE, DELAYED RELEASE ORAL 2 TIMES DAILY
Qty: 30 CAPSULE | Refills: 2 | Status: SHIPPED | OUTPATIENT
Start: 2024-07-19 | End: 2025-01-15

## 2024-07-19 RX ORDER — CEFDINIR 300 MG/1
300 CAPSULE ORAL 2 TIMES DAILY
Qty: 14 CAPSULE | Refills: 0 | Status: SHIPPED | OUTPATIENT
Start: 2024-07-19 | End: 2024-07-26

## 2024-07-19 ASSESSMENT — ENCOUNTER SYMPTOMS
CONSTIPATION: 0
SORE THROAT: 0
NAUSEA: 1
VOMITING: 1
PALPITATIONS: 0
SINUS PRESSURE: 1
DIARRHEA: 0
CHILLS: 0
FEVER: 0
SHORTNESS OF BREATH: 0
ABDOMINAL PAIN: 0

## 2024-07-19 NOTE — PROGRESS NOTES
Subjective   Patient ID: Love Alfonso is a 29 y.o. female who presents for Vomiting (X 6 weeks).    Love has not been feeling well over the past week. She did have improvement while on doxycycline and omeprazole. Since finishing both medication courses, her symptoms have returned. She is having pressure in her maxillary sinuses. Congested. Also nausea returned. Has had several episodes of vomiting. Her parents are concerned that symptoms may be due to black mold in her apartment. Black mold was discovered in the past, and it was mitigated.          Review of Systems   Constitutional:  Negative for chills and fever.   HENT:  Positive for congestion and sinus pressure. Negative for sore throat.    Respiratory:  Negative for shortness of breath.    Cardiovascular:  Negative for chest pain and palpitations.   Gastrointestinal:  Positive for nausea and vomiting. Negative for abdominal pain, constipation and diarrhea.       Objective   /68   Pulse 82   Temp 36.3 °C (97.4 °F)   SpO2 99%     Physical Exam  Constitutional:       General: She is not in acute distress.     Appearance: Normal appearance.   HENT:      Head: Normocephalic.      Mouth/Throat:      Mouth: Mucous membranes are moist.   Eyes:      Extraocular Movements: Extraocular movements intact.      Conjunctiva/sclera: Conjunctivae normal.   Cardiovascular:      Rate and Rhythm: Normal rate and regular rhythm.      Heart sounds: No murmur heard.  Pulmonary:      Breath sounds: No wheezing or rhonchi.   Abdominal:      General: Bowel sounds are normal.      Tenderness: There is no abdominal tenderness. There is no guarding or rebound.   Musculoskeletal:      Cervical back: Neck supple.   Skin:     General: Skin is warm and dry.   Neurological:      Mental Status: She is alert.   Psychiatric:         Mood and Affect: Mood normal.         Behavior: Behavior normal.         Assessment/Plan   Problem List Items Addressed This Visit             ICD-10-CM     Acute recurrent maxillary sinusitis - Primary J01.01     Since recurrence after doxyxycline course, start 7-day course of cefdinir. If black mold is in apartment, may be contributing to congestion. If symptoms recur, plan ENT consult.          Relevant Medications    cefdinir (Omnicef) 300 mg capsule    Nausea and vomiting R11.2     Recent labs reviewed and normal. Suspect related to increased acid production and possible esophagitis since symptoms resolved while on PPI. Restart omeprazole. Reduce intake of high acid foods including black and green tea. If symptoms do not resolve, follow-up with GI.          Relevant Orders    Referral to Gastroenterology     Other Visit Diagnoses         Codes    Esophagitis     K20.90    Relevant Medications    omeprazole (PriLOSEC) 20 mg DR capsule    Other Relevant Orders    Referral to Gastroenterology

## 2024-07-19 NOTE — ASSESSMENT & PLAN NOTE
Recent labs reviewed and normal. Suspect related to increased acid production and possible esophagitis since symptoms resolved while on PPI. Restart omeprazole. Reduce intake of high acid foods including black and green tea. If symptoms do not resolve, follow-up with GI.

## 2024-07-19 NOTE — ASSESSMENT & PLAN NOTE
Since recurrence after doxyxycline course, start 7-day course of cefdinir. If black mold is in apartment, may be contributing to congestion. If symptoms recur, plan ENT consult.

## 2024-09-11 DIAGNOSIS — Z30.41 ORAL CONTRACEPTIVE PILL SURVEILLANCE: Primary | ICD-10-CM

## 2024-09-11 RX ORDER — DROSPIRENONE AND ETHINYL ESTRADIOL 0.03MG-3MG
1 KIT ORAL
Qty: 84 TABLET | Refills: 0 | Status: SHIPPED | OUTPATIENT
Start: 2024-09-11

## 2024-09-24 NOTE — PROGRESS NOTES
Love Alfonso is a 29 y.o. female who is referred by Dr. Citlali Chowdhury for nausea and vomiting. She reports a several month history of nausea and vomiting. Associated with feeling of fullness. Felt better on omeprazole 20 mg which she took for 30 days but then symptoms returned once she stopped this. Has been off since July/August. She is still frequently nauseated, epigastric discomfort, and vomiting 3-4 times per week. Changed her diet without improvement, even water upsets stomach. Has been taking Gas-X and TUMS which has helped some. No weight loss. No diarrhea. No anemia. Takes ibuprofen 400 mg about twice a week for migraines.    Social history: Works at [employer]. Never tobacco. Denies alcohol and illicit drugs.    Family history: Denies family history of colon cancer or other GI disorders or malignancy.     Past Medical History:   Diagnosis Date    Allergic rhinitis due to pollen 07/09/2020    Allergic rhinitis due to pollen    Other conditions influencing health status     No significant past medical history     No past surgical history on file.    Current Outpatient Medications   Medication Sig Dispense Refill    cetirizine (ZyrTEC) 10 mg tablet Take 1 tablet (10 mg) by mouth once daily.      drospirenone-ethinyl estradioL (Brianne, Ocella) 3-0.03 mg tablet Take 1 tablet by mouth once daily. 84 tablet 0    hydrocortisone 2.5 % ointment if needed.      polyethylene glycol (Miralax) 17 gram/dose powder Take by mouth once daily.      sertraline (Zoloft) 50 mg tablet Take 2.5 tablets (125 mg) by mouth once daily.       No current facility-administered medications for this visit.       Review of Systems  Review of Systems negative except as noted in HPI.    Objective     /78   Pulse 63   Temp 36.1 °C (97 °F)   Wt 67.4 kg (148 lb 9.6 oz)   SpO2 98%      Physical Exam  Constitutional:  No acute distress. Normal appearance. Not ill-appearing.  HENT:  Head normocephalic and atraumatic. Conjunctivae  normal.  Cardiovascular:  Normal rate. Regular rhythm.  Pulmonary:  Pulmonary effort normal. No respiratory distress. Breath sounds clear.  Abdominal:  Abdomen is flat and soft. There is no distension. No tenderness or guarding.  Skin: Dry.  Neurological:  Alert and oriented.  Psychiatric:  Mood and affect normal.    Assessment/Plan     29 y.o. female who presents today for initial clinic visit for several month history of epigastric discomfort, nausea, and vomiting. Symptoms improved with omeprazole 20 mg but then returned once she stopped this. Suspect gastritis as she has noted improvement with low dose PPI. We will increase dose to 40 mg which she will take for a total of 8-12 weeks to ensure adequate healing and resolution of symptoms. For completeness will obtain celiac serologies.    Recommendations  Obtain labs.  Increase omeprazole to 40 mg on empty stomach for 8-12 weeks.  Continue Compazine as needed for nausea (does not tolerate Zofran).  Follow up in January if you continue to have symptoms. If no improvement consider further evaluate with upper endoscopy.    Electronically signed by: Love Woodall CNP on 10/1/2024 at 2:19 PM

## 2024-10-01 ENCOUNTER — OFFICE VISIT (OUTPATIENT)
Dept: GASTROENTEROLOGY | Facility: HOSPITAL | Age: 29
End: 2024-10-01
Payer: COMMERCIAL

## 2024-10-01 ENCOUNTER — LAB (OUTPATIENT)
Dept: LAB | Facility: LAB | Age: 29
End: 2024-10-01
Payer: COMMERCIAL

## 2024-10-01 VITALS
OXYGEN SATURATION: 98 % | TEMPERATURE: 97 F | DIASTOLIC BLOOD PRESSURE: 78 MMHG | SYSTOLIC BLOOD PRESSURE: 120 MMHG | HEART RATE: 63 BPM | WEIGHT: 148.6 LBS

## 2024-10-01 DIAGNOSIS — R11.2 NAUSEA AND VOMITING, UNSPECIFIED VOMITING TYPE: ICD-10-CM

## 2024-10-01 DIAGNOSIS — K29.70 GASTRITIS WITHOUT BLEEDING, UNSPECIFIED CHRONICITY, UNSPECIFIED GASTRITIS TYPE: Primary | ICD-10-CM

## 2024-10-01 LAB
GLIADIN PEPTIDE IGA SER IA-ACNC: <1 U/ML
TTG IGA SER IA-ACNC: <1 U/ML

## 2024-10-01 PROCEDURE — 36415 COLL VENOUS BLD VENIPUNCTURE: CPT

## 2024-10-01 PROCEDURE — 1036F TOBACCO NON-USER: CPT | Performed by: NURSE PRACTITIONER

## 2024-10-01 PROCEDURE — 99214 OFFICE O/P EST MOD 30 MIN: CPT | Performed by: NURSE PRACTITIONER

## 2024-10-01 PROCEDURE — 99204 OFFICE O/P NEW MOD 45 MIN: CPT | Performed by: NURSE PRACTITIONER

## 2024-10-01 RX ORDER — PROCHLORPERAZINE MALEATE 10 MG
10 TABLET ORAL EVERY 6 HOURS PRN
Qty: 20 TABLET | Refills: 0 | Status: SHIPPED | OUTPATIENT
Start: 2024-10-01

## 2024-10-01 RX ORDER — OMEPRAZOLE 40 MG/1
40 CAPSULE, DELAYED RELEASE ORAL DAILY
Qty: 90 CAPSULE | Refills: 3 | Status: SHIPPED | OUTPATIENT
Start: 2024-10-01 | End: 2025-10-01

## 2024-10-01 RX ORDER — HYDROCORTISONE 25 MG/G
OINTMENT TOPICAL AS NEEDED
COMMUNITY
Start: 2023-03-02

## 2024-10-01 SDOH — ECONOMIC STABILITY: FOOD INSECURITY: WITHIN THE PAST 12 MONTHS, YOU WORRIED THAT YOUR FOOD WOULD RUN OUT BEFORE YOU GOT MONEY TO BUY MORE.: NEVER TRUE

## 2024-10-01 SDOH — ECONOMIC STABILITY: FOOD INSECURITY: WITHIN THE PAST 12 MONTHS, THE FOOD YOU BOUGHT JUST DIDN'T LAST AND YOU DIDN'T HAVE MONEY TO GET MORE.: NEVER TRUE

## 2024-10-01 ASSESSMENT — PAIN SCALES - GENERAL: PAINLEVEL: 0-NO PAIN

## 2024-10-01 ASSESSMENT — LIFESTYLE VARIABLES
AUDIT-C TOTAL SCORE: 0
HOW OFTEN DO YOU HAVE A DRINK CONTAINING ALCOHOL: NEVER
SKIP TO QUESTIONS 9-10: 1
HOW MANY STANDARD DRINKS CONTAINING ALCOHOL DO YOU HAVE ON A TYPICAL DAY: PATIENT DOES NOT DRINK
HOW OFTEN DO YOU HAVE SIX OR MORE DRINKS ON ONE OCCASION: NEVER

## 2024-10-01 ASSESSMENT — PATIENT HEALTH QUESTIONNAIRE - PHQ9
SUM OF ALL RESPONSES TO PHQ9 QUESTIONS 1 & 2: 2
1. LITTLE INTEREST OR PLEASURE IN DOING THINGS: SEVERAL DAYS
10. IF YOU CHECKED OFF ANY PROBLEMS, HOW DIFFICULT HAVE THESE PROBLEMS MADE IT FOR YOU TO DO YOUR WORK, TAKE CARE OF THINGS AT HOME, OR GET ALONG WITH OTHER PEOPLE: NOT DIFFICULT AT ALL
2. FEELING DOWN, DEPRESSED OR HOPELESS: SEVERAL DAYS

## 2024-10-01 NOTE — PATIENT INSTRUCTIONS
Recommendations  Obtain labs to rule out celiac disease.  Increase omeprazole to 40 mg on empty stomach for 8-12 weeks.  Continue Compazine as needed for nausea.  Follow up in January if you continue to have symptoms. Please call the office at [phone] with any questions or concerns.

## 2024-10-03 LAB
GLIADIN PEPTIDE IGG SER IA-ACNC: 3.34 FLU (ref 0–4.99)
TTG IGG SER IA-ACNC: <0.82 FLU (ref 0–4.99)

## 2024-10-30 ENCOUNTER — APPOINTMENT (OUTPATIENT)
Dept: PRIMARY CARE | Facility: CLINIC | Age: 29
End: 2024-10-30
Payer: COMMERCIAL

## 2024-10-30 VITALS
SYSTOLIC BLOOD PRESSURE: 108 MMHG | OXYGEN SATURATION: 99 % | TEMPERATURE: 97.8 F | WEIGHT: 149 LBS | DIASTOLIC BLOOD PRESSURE: 70 MMHG | HEART RATE: 88 BPM

## 2024-10-30 DIAGNOSIS — Z30.41 ORAL CONTRACEPTIVE PILL SURVEILLANCE: ICD-10-CM

## 2024-10-30 DIAGNOSIS — Z00.00 WELLNESS EXAMINATION: Primary | ICD-10-CM

## 2024-10-30 DIAGNOSIS — Z11.3 SCREEN FOR STD (SEXUALLY TRANSMITTED DISEASE): ICD-10-CM

## 2024-10-30 DIAGNOSIS — Z12.4 SCREENING FOR CERVICAL CANCER: ICD-10-CM

## 2024-10-30 PROBLEM — M54.16 LUMBAR BACK PAIN WITH RADICULOPATHY AFFECTING RIGHT LOWER EXTREMITY: Status: RESOLVED | Noted: 2024-03-20 | Resolved: 2024-10-30

## 2024-10-30 PROCEDURE — 87491 CHLMYD TRACH DNA AMP PROBE: CPT

## 2024-10-30 PROCEDURE — 87591 N.GONORRHOEAE DNA AMP PROB: CPT

## 2024-10-30 PROCEDURE — 99395 PREV VISIT EST AGE 18-39: CPT | Performed by: FAMILY MEDICINE

## 2024-10-30 RX ORDER — DROSPIRENONE AND ETHINYL ESTRADIOL 0.03MG-3MG
1 KIT ORAL
Qty: 84 TABLET | Refills: 3 | Status: SHIPPED | OUTPATIENT
Start: 2024-10-30

## 2024-10-30 ASSESSMENT — ENCOUNTER SYMPTOMS
COUGH: 0
DYSURIA: 0
FREQUENCY: 0

## 2024-10-31 LAB
C TRACH RRNA SPEC QL NAA+PROBE: NEGATIVE
N GONORRHOEA DNA SPEC QL PROBE+SIG AMP: NEGATIVE

## 2024-11-12 LAB
CYTOLOGY CMNT CVX/VAG CYTO-IMP: NORMAL
LAB AP CONTRACEPTIVE HISTORY: NORMAL
LAB AP HPV GENOTYPE QUESTION: YES
LAB AP HPV HR: NORMAL
LAB AP PAP ADDITIONAL TESTS: NORMAL
LABORATORY COMMENT REPORT: NORMAL
LMP START DATE: NORMAL
PATH REPORT.TOTAL CANCER: NORMAL

## 2024-11-21 DIAGNOSIS — Z30.41 ORAL CONTRACEPTIVE PILL SURVEILLANCE: ICD-10-CM

## 2024-11-22 RX ORDER — DROSPIRENONE AND ETHINYL ESTRADIOL 0.03MG-3MG
1 KIT ORAL DAILY
Qty: 84 TABLET | Refills: 3 | Status: SHIPPED | OUTPATIENT
Start: 2024-11-22

## 2024-12-02 ENCOUNTER — APPOINTMENT (OUTPATIENT)
Dept: GASTROENTEROLOGY | Facility: CLINIC | Age: 29
End: 2024-12-02
Payer: COMMERCIAL

## 2025-01-14 NOTE — PROGRESS NOTES
Love Alfonso is a 29 y.o. female with past medical history of lactose intolerance who presents today for follow up of nausea and vomiting. Initially seen 10/2024 for several month history of nausea and vomiting. Felt a bit better on omeprazole 20 mg which she took for 30 days but then symptoms returned once stopped. Frequently nauseated, epigastric discomfort, and vomiting 3-4 times per week. Changed diet but even water upsets stomach. Tried Gas-X, TUMS. No weight loss. No diarrhea. No anemia. Taking NSAIDS about twice a week for migraines.    Celiac serologies normal. Increased PPI to 40 mg daily.    Presents today for follow up. Completed 8 weeks omeprazole 40 mg daily with Compazine as needed (does not tolerate Zofran). She had no improvement. At its worst was throwing up every other day. Now not as frequent but still vomiting, although it is not very much that comes up. Will come on suddenly. Some discomfort, heartburn, reflux, hoarse voice. Unable to tolerate lots of foods. Feels full, even with water. TUMS don't seem to help. Taking tumeric, aloe juice. Stopped NSAIDS. Bowels normal, tends more towards constipation. Miralax as needed.      Social history: Works at [employer]. Never tobacco. Denies alcohol and illicit drugs. Never marijuana.     Family history: Denies family history of colon cancer or other GI disorders or malignancy.     Current Outpatient Medications   Medication Sig Dispense Refill    cetirizine (ZyrTEC) 10 mg tablet Take 1 tablet (10 mg) by mouth once daily.      drospirenone-ethinyl estradioL (Brianne, Ocella) 3-0.03 mg tablet TAKE 1 TABLET ONCE DAILY 84 tablet 3    hydrocortisone 2.5 % ointment if needed.      polyethylene glycol (Miralax) 17 gram/dose powder Take by mouth once daily.      sertraline (Zoloft) 50 mg tablet Take 2.5 tablets (125 mg) by mouth once daily.      omeprazole (PriLOSEC) 40 mg DR capsule Take 1 capsule (40 mg) by mouth 2 times a day. Do not crush or chew. 180 capsule  "1     No current facility-administered medications for this visit.       Review of Systems  Review of Systems negative except as noted in HPI.    Objective     /84   Pulse 92   Temp 36.3 °C (97.3 °F)   Ht 1.651 m (5' 5\")   Wt 66.2 kg (146 lb)   BMI 24.30 kg/m²      Physical Exam  Constitutional:  No acute distress. Normal appearance. Not ill-appearing.  HENT:  Head normocephalic and atraumatic. Conjunctivae normal.  Cardiovascular:  Normal rate. Regular rhythm.  Pulmonary:  Pulmonary effort normal. No respiratory distress. Breath sounds clear.  Abdominal:  Abdomen is flat and soft. There is no distension. No tenderness or guarding.  Skin: Dry.  Neurological:  Alert and oriented.  Psychiatric:  Mood and affect normal.    Assessment/Plan     29 y.o. female who presents today for clinic follow up visit for several month history of epigastric discomfort, nausea, vomiting episodes, and fullness. She competed 8 weeks PPI therapy without improvement and is still having frequent symptoms. Discussed increasing medication to twice daily and obtaining EGD to rule out peptic ulcer disease, H. Pylori gastritis, celiac disease (less likely as serologies normal). We will also obtain gastric emptying study to exclude gastroparesis.     Recommendations  Increase omeprazole to 40 mg to twice daily.  Obtain gastric emptying study and EGD.  Follow up after above testing.    Electronically signed by: Love Woodall CNP on 1/23/2025 at 10:33 AM      "

## 2025-01-23 ENCOUNTER — OFFICE VISIT (OUTPATIENT)
Dept: GASTROENTEROLOGY | Facility: CLINIC | Age: 30
End: 2025-01-23
Payer: COMMERCIAL

## 2025-01-23 VITALS
WEIGHT: 146 LBS | DIASTOLIC BLOOD PRESSURE: 84 MMHG | HEART RATE: 92 BPM | BODY MASS INDEX: 24.32 KG/M2 | TEMPERATURE: 97.3 F | SYSTOLIC BLOOD PRESSURE: 117 MMHG | HEIGHT: 65 IN

## 2025-01-23 DIAGNOSIS — R11.2 NAUSEA AND VOMITING, UNSPECIFIED VOMITING TYPE: Primary | ICD-10-CM

## 2025-01-23 DIAGNOSIS — R12 HEARTBURN: ICD-10-CM

## 2025-01-23 DIAGNOSIS — R10.10 UPPER ABDOMINAL PAIN: ICD-10-CM

## 2025-01-23 DIAGNOSIS — R68.81 EARLY SATIETY: ICD-10-CM

## 2025-01-23 PROCEDURE — 99214 OFFICE O/P EST MOD 30 MIN: CPT | Performed by: NURSE PRACTITIONER

## 2025-01-23 PROCEDURE — 3008F BODY MASS INDEX DOCD: CPT | Performed by: NURSE PRACTITIONER

## 2025-01-23 RX ORDER — OMEPRAZOLE 40 MG/1
40 CAPSULE, DELAYED RELEASE ORAL 2 TIMES DAILY
Qty: 180 CAPSULE | Refills: 1 | Status: SHIPPED | OUTPATIENT
Start: 2025-01-23

## 2025-01-23 NOTE — PATIENT INSTRUCTIONS
Recommendations  Increase omeprazole to 40 mg to twice daily.  Obtain gastric emptying study to rule out gastroparesis.   Schedule EGD (upper endoscopy). You can call [phone] to schedule.   Follow up after procedure.

## 2025-01-24 ENCOUNTER — ANESTHESIA EVENT (OUTPATIENT)
Dept: GASTROENTEROLOGY | Facility: EXTERNAL LOCATION | Age: 30
End: 2025-01-24

## 2025-02-03 ENCOUNTER — HOSPITAL ENCOUNTER (OUTPATIENT)
Dept: RADIOLOGY | Facility: HOSPITAL | Age: 30
Discharge: HOME | End: 2025-02-03
Payer: COMMERCIAL

## 2025-02-03 DIAGNOSIS — R11.2 NAUSEA AND VOMITING, UNSPECIFIED VOMITING TYPE: ICD-10-CM

## 2025-02-03 DIAGNOSIS — R68.81 EARLY SATIETY: ICD-10-CM

## 2025-02-03 PROCEDURE — 78264 GASTRIC EMPTYING IMG STUDY: CPT

## 2025-02-03 PROCEDURE — 78264 GASTRIC EMPTYING IMG STUDY: CPT | Performed by: STUDENT IN AN ORGANIZED HEALTH CARE EDUCATION/TRAINING PROGRAM

## 2025-02-03 PROCEDURE — A9541 TC99M SULFUR COLLOID: HCPCS | Performed by: NUCLEAR MEDICINE

## 2025-02-03 PROCEDURE — 3430000001 HC RX 343 DIAGNOSTIC RADIOPHARMACEUTICALS: Performed by: NUCLEAR MEDICINE

## 2025-02-03 RX ADMIN — TECHNETIUM TC 99M SULFUR COLLOID 1 MILLICURIE: KIT at 08:40

## 2025-02-11 ENCOUNTER — APPOINTMENT (OUTPATIENT)
Dept: GASTROENTEROLOGY | Facility: EXTERNAL LOCATION | Age: 30
End: 2025-02-11
Payer: COMMERCIAL

## 2025-02-11 ENCOUNTER — TELEPHONE (OUTPATIENT)
Dept: PULMONOLOGY | Facility: HOSPITAL | Age: 30
End: 2025-02-11

## 2025-02-11 ENCOUNTER — ANESTHESIA (OUTPATIENT)
Dept: GASTROENTEROLOGY | Facility: EXTERNAL LOCATION | Age: 30
End: 2025-02-11

## 2025-02-11 VITALS
RESPIRATION RATE: 14 BRPM | BODY MASS INDEX: 23.32 KG/M2 | DIASTOLIC BLOOD PRESSURE: 80 MMHG | WEIGHT: 140 LBS | HEART RATE: 62 BPM | OXYGEN SATURATION: 100 % | TEMPERATURE: 97.9 F | SYSTOLIC BLOOD PRESSURE: 123 MMHG | HEIGHT: 65 IN

## 2025-02-11 DIAGNOSIS — R12 HEARTBURN: ICD-10-CM

## 2025-02-11 DIAGNOSIS — R11.2 NAUSEA AND VOMITING, UNSPECIFIED VOMITING TYPE: Primary | ICD-10-CM

## 2025-02-11 DIAGNOSIS — R68.81 EARLY SATIETY: ICD-10-CM

## 2025-02-11 DIAGNOSIS — R10.10 UPPER ABDOMINAL PAIN: ICD-10-CM

## 2025-02-11 LAB — PREGNANCY TEST URINE, POC: NEGATIVE

## 2025-02-11 PROCEDURE — 81025 URINE PREGNANCY TEST: CPT | Performed by: INTERNAL MEDICINE

## 2025-02-11 PROCEDURE — 43239 EGD BIOPSY SINGLE/MULTIPLE: CPT | Performed by: INTERNAL MEDICINE

## 2025-02-11 RX ORDER — PROPOFOL 10 MG/ML
INJECTION, EMULSION INTRAVENOUS AS NEEDED
Status: DISCONTINUED | OUTPATIENT
Start: 2025-02-11 | End: 2025-02-11

## 2025-02-11 RX ORDER — LIDOCAINE HYDROCHLORIDE 20 MG/ML
INJECTION, SOLUTION EPIDURAL; INFILTRATION; INTRACAUDAL; PERINEURAL AS NEEDED
Status: DISCONTINUED | OUTPATIENT
Start: 2025-02-11 | End: 2025-02-11

## 2025-02-11 RX ORDER — SODIUM CHLORIDE 9 MG/ML
INJECTION, SOLUTION INTRAVENOUS CONTINUOUS PRN
Status: DISCONTINUED | OUTPATIENT
Start: 2025-02-11 | End: 2025-02-11

## 2025-02-11 RX ADMIN — SODIUM CHLORIDE: 9 INJECTION, SOLUTION INTRAVENOUS at 08:54

## 2025-02-11 RX ADMIN — PROPOFOL 100 MG: 10 INJECTION, EMULSION INTRAVENOUS at 08:56

## 2025-02-11 RX ADMIN — PROPOFOL 50 MG: 10 INJECTION, EMULSION INTRAVENOUS at 08:59

## 2025-02-11 RX ADMIN — LIDOCAINE HYDROCHLORIDE 100 MG: 20 INJECTION, SOLUTION EPIDURAL; INFILTRATION; INTRACAUDAL; PERINEURAL at 08:56

## 2025-02-11 RX ADMIN — PROPOFOL 50 MG: 10 INJECTION, EMULSION INTRAVENOUS at 08:58

## 2025-02-11 SDOH — HEALTH STABILITY: MENTAL HEALTH: CURRENT SMOKER: 0

## 2025-02-11 ASSESSMENT — PAIN - FUNCTIONAL ASSESSMENT
PAIN_FUNCTIONAL_ASSESSMENT: 0-10
PAIN_FUNCTIONAL_ASSESSMENT: 0-10

## 2025-02-11 ASSESSMENT — COLUMBIA-SUICIDE SEVERITY RATING SCALE - C-SSRS
2. HAVE YOU ACTUALLY HAD ANY THOUGHTS OF KILLING YOURSELF?: NO
1. IN THE PAST MONTH, HAVE YOU WISHED YOU WERE DEAD OR WISHED YOU COULD GO TO SLEEP AND NOT WAKE UP?: NO
6. HAVE YOU EVER DONE ANYTHING, STARTED TO DO ANYTHING, OR PREPARED TO DO ANYTHING TO END YOUR LIFE?: NO

## 2025-02-11 ASSESSMENT — PAIN SCALES - GENERAL
PAINLEVEL_OUTOF10: 0 - NO PAIN
PAINLEVEL_OUTOF10: 0 - NO PAIN

## 2025-02-11 NOTE — ANESTHESIA POSTPROCEDURE EVALUATION
Patient: Love Alfonso    Procedure Summary       Date: 02/11/25 Room / Location: [facility]    Anesthesia Start: 0854 Anesthesia Stop:     Procedure: EGD Diagnosis:       Nausea and vomiting, unspecified vomiting type      Early satiety      Upper abdominal pain      Heartburn      Nausea and vomiting, unspecified vomiting type    Scheduled Providers: Theodore Rodriguez MD Responsible Provider: KAM Noyola    Anesthesia Type: MAC ASA Status: 1            Anesthesia Type: No value filed.    Vitals Value Taken Time   /62 02/11/25 0905   Temp 36.4 02/11/25 0905   Pulse 78 02/11/25 0905   Resp 16 02/11/25 0905   SpO2 99 02/11/25 0905       Anesthesia Post Evaluation    Patient location during evaluation: PACU  Patient participation: complete - patient participated  Level of consciousness: awake and alert  Pain management: satisfactory to patient  Airway patency: patent  Two or more strategies used to mitigate risk of obstructive sleep apnea  Cardiovascular status: acceptable  Respiratory status: acceptable  Hydration status: acceptable  Postoperative Nausea and Vomiting: none        There were no known notable events for this encounter.

## 2025-02-11 NOTE — H&P
Outpatient NR Procedure H&P    Patient Profile  Name Love Alfonso  Date of Birth 1995  MRN [id number]  PCP Citlali Chowdhury        Diagnosis: abd pain,egd  Procedure(s):  EGD       Allergies  Allergies   Allergen Reactions    Lactose Other    Sulfa (Sulfonamide Antibiotics) Unknown       Past Medical History   Past Medical History:   Diagnosis Date    Allergic rhinitis due to pollen 07/09/2020    Allergic rhinitis due to pollen    Chronic constipation 1/1/2005    Depression 9/1/2004    Food intolerance Lactose    GERD (gastroesophageal reflux disease)     Other conditions influencing health status     No significant past medical history       Medication Reviewed - yes  Prior to Admission medications    Medication Sig Start Date End Date Taking? Authorizing Provider   cetirizine (ZyrTEC) 10 mg tablet Take 1 tablet (10 mg) by mouth once daily.   Yes Historical Provider, MD   drospirenone-ethinyl estradioL (Brianne, Ocella) 3-0.03 mg tablet TAKE 1 TABLET ONCE DAILY 11/22/24  Yes Citlali Chowdhury,    polyethylene glycol (Miralax) 17 gram/dose powder Take by mouth once daily. 5/7/12  Yes Historical Provider, MD   sertraline (Zoloft) 50 mg tablet Take 2.5 tablets (125 mg) by mouth once daily. 5/10/23  Yes Historical Provider, MD   hydrocortisone 2.5 % ointment if needed. 3/2/23   Historical Provider, MD   omeprazole (PriLOSEC) 40 mg DR capsule Take 1 capsule (40 mg) by mouth 2 times a day. Do not crush or chew. 1/23/25   LESLIE Angulo-CNP       Physical Exam  Vitals:    02/11/25 0822   BP: 113/73   Pulse: 86   Resp: 20   Temp: 36.8 °C (98.2 °F)   SpO2: 100%      Weight   Vitals:    02/11/25 0822   Weight: 63.5 kg (140 lb)     BMI Body mass index is 23.3 kg/m².    General: A&Ox3, NAD.  HEENT: AT/NC.   CV: RRR. No murmur.  Resp: CTA bilaterally. No wheezing, rhonchi or rales.   GI: Soft, NT/ND. BSx4.  Extrem: No edema. Pulses intact.  Skin: No Jaundice.   Neuro: No focal deficits.   Psych: Normal mood and  affect.        Oropharyngeal Classification I (soft palate, uvula, fauces, and tonsillar pillars visible)  ASA PS Classification 2  Sedation Plan: Deep Sedation.  Procedure Plan - pre-procedural (re)assesment completed by physician:  discharge/transfer patient when discharge criteria met    Theodore Rodriguez MD  2/11/2025 8:46 AM

## 2025-02-11 NOTE — ANESTHESIA PREPROCEDURE EVALUATION
Patient: Love Alfonso    Procedure Information       Date/Time: 02/11/25 0830    Scheduled providers: Theodore Rodriguez MD    Procedure: EGD    Location: [facility]            Relevant Problems   Neuro   (+) Major depressive disorder, recurrent episode, moderate      HEENT   (+) Acute recurrent maxillary sinusitis       Clinical information reviewed:   Tobacco  Allergies  Meds   Med Hx  Surg Hx  OB Status  Fam Hx  Soc   Hx        NPO Detail:  NPO/Void Status  Date of Last Liquid: 02/10/25  Time of Last Liquid: 2300  Date of Last Solid: 02/10/25  Time of Last Solid: 2100  Last Intake Type: Clear fluids         Physical Exam    Airway  Mallampati: II  TM distance: >3 FB  Neck ROM: full     Cardiovascular - normal exam     Dental    Pulmonary - normal exam     Abdominal - normal exam         Anesthesia Plan    History of general anesthesia?: yes  History of complications of general anesthesia?: no    ASA 1     MAC     The patient is not a current smoker.  Patient was not previously instructed to abstain from smoking on day of procedure.  Patient did not smoke on day of procedure.    intravenous induction   Anesthetic plan and risks discussed with patient.  Use of blood products discussed with patient who consented to blood products.    Plan discussed with CRNA.

## 2025-02-11 NOTE — DISCHARGE INSTRUCTIONS

## 2025-02-17 DIAGNOSIS — R10.10 UPPER ABDOMINAL PAIN: ICD-10-CM

## 2025-02-17 DIAGNOSIS — R11.2 NAUSEA AND VOMITING, UNSPECIFIED VOMITING TYPE: Primary | ICD-10-CM

## 2025-02-19 LAB
LAB AP ASR DISCLAIMER: NORMAL
LABORATORY COMMENT REPORT: NORMAL
PATH REPORT.FINAL DX SPEC: NORMAL
PATH REPORT.GROSS SPEC: NORMAL
PATH REPORT.TOTAL CANCER: NORMAL

## 2025-03-05 ENCOUNTER — APPOINTMENT (OUTPATIENT)
Dept: RADIOLOGY | Facility: HOSPITAL | Age: 30
End: 2025-03-05
Payer: COMMERCIAL

## 2025-03-06 ENCOUNTER — HOSPITAL ENCOUNTER (OUTPATIENT)
Dept: RADIOLOGY | Facility: HOSPITAL | Age: 30
Discharge: HOME | End: 2025-03-06
Payer: COMMERCIAL

## 2025-03-06 VITALS — WEIGHT: 150 LBS | BODY MASS INDEX: 24.96 KG/M2

## 2025-03-06 DIAGNOSIS — R10.10 UPPER ABDOMINAL PAIN: ICD-10-CM

## 2025-03-06 DIAGNOSIS — R11.2 NAUSEA AND VOMITING, UNSPECIFIED VOMITING TYPE: ICD-10-CM

## 2025-03-06 PROCEDURE — 78227 HEPATOBIL SYST IMAGE W/DRUG: CPT

## 2025-03-06 PROCEDURE — A9537 TC99M MEBROFENIN: HCPCS | Performed by: STUDENT IN AN ORGANIZED HEALTH CARE EDUCATION/TRAINING PROGRAM

## 2025-03-06 PROCEDURE — 2500000004 HC RX 250 GENERAL PHARMACY W/ HCPCS (ALT 636 FOR OP/ED): Performed by: NURSE PRACTITIONER

## 2025-03-06 PROCEDURE — 3430000001 HC RX 343 DIAGNOSTIC RADIOPHARMACEUTICALS: Performed by: STUDENT IN AN ORGANIZED HEALTH CARE EDUCATION/TRAINING PROGRAM

## 2025-03-06 PROCEDURE — 76705 ECHO EXAM OF ABDOMEN: CPT

## 2025-03-06 RX ORDER — SINCALIDE 5 UG/5ML
0.02 INJECTION, POWDER, LYOPHILIZED, FOR SOLUTION INTRAVENOUS ONCE
Status: COMPLETED | OUTPATIENT
Start: 2025-03-06 | End: 2025-03-06

## 2025-03-06 RX ORDER — KIT FOR THE PREPARATION OF TECHNETIUM TC 99M MEBROFENIN 45 MG/10ML
5 INJECTION, POWDER, LYOPHILIZED, FOR SOLUTION INTRAVENOUS
Status: COMPLETED | OUTPATIENT
Start: 2025-03-06 | End: 2025-03-06

## 2025-03-06 RX ADMIN — SINCALIDE 1.35 MCG: 5 INJECTION, POWDER, LYOPHILIZED, FOR SOLUTION INTRAVENOUS at 09:30

## 2025-03-06 RX ADMIN — KIT FOR THE PREPARATION OF TECHNETIUM TC 99M MEBROFENIN 5 MILLICURIE: 45 INJECTION, POWDER, LYOPHILIZED, FOR SOLUTION INTRAVENOUS at 08:15

## 2025-03-07 ENCOUNTER — TELEPHONE (OUTPATIENT)
Dept: GASTROENTEROLOGY | Facility: CLINIC | Age: 30
End: 2025-03-07
Payer: COMMERCIAL

## 2025-03-07 DIAGNOSIS — R10.10 UPPER ABDOMINAL PAIN: ICD-10-CM

## 2025-03-07 DIAGNOSIS — K76.9 LIVER LESION: Primary | ICD-10-CM

## 2025-03-07 NOTE — TELEPHONE ENCOUNTER
Called and LVM. US shows liver lesion. Ordered MRI and recommend visit with Hepatology after to review results and further management.

## 2025-03-14 ENCOUNTER — HOSPITAL ENCOUNTER (OUTPATIENT)
Dept: RADIOLOGY | Facility: HOSPITAL | Age: 30
Discharge: HOME | End: 2025-03-14
Payer: COMMERCIAL

## 2025-03-14 DIAGNOSIS — R10.10 UPPER ABDOMINAL PAIN: ICD-10-CM

## 2025-03-14 DIAGNOSIS — K76.9 LIVER LESION: ICD-10-CM

## 2025-03-14 PROCEDURE — 2550000001 HC RX 255 CONTRASTS: Performed by: NURSE PRACTITIONER

## 2025-03-14 PROCEDURE — A9575 INJ GADOTERATE MEGLUMI 0.1ML: HCPCS | Performed by: NURSE PRACTITIONER

## 2025-03-14 PROCEDURE — 74183 MRI ABD W/O CNTR FLWD CNTR: CPT

## 2025-03-14 RX ORDER — GADOTERATE MEGLUMINE 376.9 MG/ML
13 INJECTION INTRAVENOUS
Status: COMPLETED | OUTPATIENT
Start: 2025-03-14 | End: 2025-03-14

## 2025-03-14 RX ADMIN — GADOTERATE MEGLUMINE 13 ML: 376.9 INJECTION INTRAVENOUS at 20:14

## 2025-03-25 NOTE — PROGRESS NOTES
Love Alfonso is a 30 y.o. female with past medical history of lactose intolerance who presents today for follow up of nausea and vomiting. Initially seen 10/2024 for several month history of nausea and vomiting. Frequently nauseated and vomiting 3-4 times per week. Changed diet but even water upsets stomach. Tried Gas-X, TUMS, PPI without relief. No weight loss. No diarrhea. No anemia. Taking NSAIDS about twice a week for migraines.     10/2024 Celiac serologies normal. Increased PPI to 40 mg daily.     1/2025 Completed 8 weeks omeprazole 40 mg daily with Compazine as needed (does not tolerate Zofran) without improvement. At its worst was vomiting every other day. Will come on suddenly. Some discomfort, heartburn, reflux, hoarse voice. Unable to tolerate lots of foods. Feels full, even with water. Taking tumeric, aloe juice. Stopped NSAIDS.     2/2025 US and HIDA scan with normal gallbladder, but found to have liver lesion. MRI liver with focal nodular hyperplasia versus adenoma. Repeat 6 months and referred to Hepatology. Gastric emptying study without gastroparesis.     2/2025 EGD normal. No H. Pylori or celiac disease.    Presents today for follow up. She still has nausea and vomiting episodes a few days per week. No pain and reflux improved so stopped PPI. Has kept food diary, tried different diets and nothing helps. Appears random. She stopped her oral contraceptive 2 months ago to see if this was the cause but no change. Able to eat normally. Takes sertaline. Does not think correlated as she has been on this for 4 years. No diarrhea. Taking Miralax daily with BM every other day or every 2 days. Does not feel constipated.     Social history: Works at [employer]. Never tobacco. Denies alcohol and illicit drugs. Never marijuana.     Family history: Denies family history of colon cancer or other GI disorders or malignancy.     Past Medical History:   Diagnosis Date    Allergic rhinitis due to pollen 07/09/2020     "Allergic rhinitis due to pollen    Chronic constipation 1/1/2005    Depression 9/1/2004    Food intolerance Lactose    GERD (gastroesophageal reflux disease)     Other conditions influencing health status     No significant past medical history     Past Surgical History:   Procedure Laterality Date    WISDOM TOOTH EXTRACTION  2023     Current Outpatient Medications   Medication Sig Dispense Refill    cetirizine (ZyrTEC) 10 mg tablet Take 1 tablet (10 mg) by mouth once daily.      hydrocortisone 2.5 % ointment if needed.      polyethylene glycol (Miralax) 17 gram/dose powder Take by mouth once daily.      sertraline (Zoloft) 50 mg tablet Take 2.5 tablets (125 mg) by mouth once daily.      amitriptyline (Elavil) 10 mg tablet Take 1 tablet (10 mg) by mouth once daily at bedtime. 90 tablet 3     No current facility-administered medications for this visit.       Review of Systems  Review of Systems negative except as noted in HPI.    Objective     /73   Pulse 99   Temp 36.7 °C (98.1 °F)   Ht 1.651 m (5' 5\")   Wt 64.9 kg (143 lb)   BMI 23.80 kg/m²      Physical Exam  Constitutional:  No acute distress. Normal appearance. Not ill-appearing.  HENT:  Head normocephalic and atraumatic. Conjunctivae normal.  Cardiovascular:  Normal rate. Regular rhythm.  Pulmonary:  Pulmonary effort normal. No respiratory distress. Breath sounds clear.  Abdominal:  Abdomen is flat and soft. There is no distension. No tenderness or guarding.  Skin: Dry.  Neurological:  Alert and oriented.  Psychiatric:  Mood and affect normal.    Assessment/Plan     30 y.o. female who presents today for clinic follow up of nausea and vomiting episodes that occur a few times per week. Thus far labs, US, HIDA scan, gastric emptying study, and EGD have been unrevealing. She continues to have nausea and vomiting despite several months PPI therapy, Compazine prn (does not tolerate Zofran). No dizziness, does not think she has POTS. Offered MRI brain, this " was declined today. Will trial amitriptyline for possible cyclic vomiting syndrome, functional dyspepsia.    Liver lesion - Repeat MRI 6 months, refer to hepatology.    Recommendations  Start trial of amitriptyline 10 mg nightly for possible cyclical vomiting syndrome, functional dyspepsia. We discussed possible serotonin reaction with her sertraline, however amitriptyline dose very low. Advised to notify other provider that she is on both medications.  Repeat MRI 6 months. Follow up with Hepatology.  Follow up 3 months.    Electronically signed by: Love Woodall CNP on 4/3/2025 at 11:41 AM

## 2025-03-28 ENCOUNTER — APPOINTMENT (OUTPATIENT)
Dept: GASTROENTEROLOGY | Facility: CLINIC | Age: 30
End: 2025-03-28
Payer: COMMERCIAL

## 2025-04-03 ENCOUNTER — OFFICE VISIT (OUTPATIENT)
Dept: GASTROENTEROLOGY | Facility: CLINIC | Age: 30
End: 2025-04-03
Payer: COMMERCIAL

## 2025-04-03 VITALS
TEMPERATURE: 98.1 F | HEART RATE: 99 BPM | DIASTOLIC BLOOD PRESSURE: 73 MMHG | SYSTOLIC BLOOD PRESSURE: 105 MMHG | WEIGHT: 143 LBS | BODY MASS INDEX: 23.82 KG/M2 | HEIGHT: 65 IN

## 2025-04-03 DIAGNOSIS — R11.15 CYCLIC VOMITING SYNDROME: ICD-10-CM

## 2025-04-03 DIAGNOSIS — K76.9 LIVER LESION: ICD-10-CM

## 2025-04-03 DIAGNOSIS — R11.2 NAUSEA AND VOMITING, UNSPECIFIED VOMITING TYPE: Primary | ICD-10-CM

## 2025-04-03 PROCEDURE — 99214 OFFICE O/P EST MOD 30 MIN: CPT | Performed by: NURSE PRACTITIONER

## 2025-04-03 PROCEDURE — 3008F BODY MASS INDEX DOCD: CPT | Performed by: NURSE PRACTITIONER

## 2025-04-03 RX ORDER — AMITRIPTYLINE HYDROCHLORIDE 10 MG/1
10 TABLET, FILM COATED ORAL NIGHTLY
Qty: 90 TABLET | Refills: 3 | Status: SHIPPED | OUTPATIENT
Start: 2025-04-03 | End: 2026-04-03

## 2025-04-03 RX ORDER — ALBUTEROL SULFATE 90 UG/1
1-2 INHALANT RESPIRATORY (INHALATION)
COMMUNITY
Start: 2025-03-05 | End: 2025-04-03 | Stop reason: WASHOUT

## 2025-04-28 ENCOUNTER — ANCILLARY PROCEDURE (OUTPATIENT)
Dept: URGENT CARE | Age: 30
End: 2025-04-28
Payer: COMMERCIAL

## 2025-04-28 ENCOUNTER — OFFICE VISIT (OUTPATIENT)
Dept: URGENT CARE | Age: 30
End: 2025-04-28
Payer: COMMERCIAL

## 2025-04-28 VITALS
TEMPERATURE: 97.2 F | BODY MASS INDEX: 23.82 KG/M2 | HEART RATE: 59 BPM | SYSTOLIC BLOOD PRESSURE: 116 MMHG | OXYGEN SATURATION: 98 % | WEIGHT: 143 LBS | DIASTOLIC BLOOD PRESSURE: 76 MMHG | RESPIRATION RATE: 16 BRPM | HEIGHT: 65 IN
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
## 2025-04-28 DIAGNOSIS — M25.512 ACUTE PAIN OF LEFT SHOULDER: ICD-10-CM

## 2025-04-28 DIAGNOSIS — S06.0X0A CONCUSSION WITHOUT LOSS OF CONSCIOUSNESS, INITIAL ENCOUNTER: ICD-10-CM

## 2025-04-28 DIAGNOSIS — M54.2 NECK PAIN: ICD-10-CM

## 2025-04-28 PROCEDURE — 3008F BODY MASS INDEX DOCD: CPT | Performed by: NURSE PRACTITIONER

## 2025-04-28 PROCEDURE — 99213 OFFICE O/P EST LOW 20 MIN: CPT | Performed by: NURSE PRACTITIONER

## 2025-04-28 PROCEDURE — 72050 X-RAY EXAM NECK SPINE 4/5VWS: CPT | Performed by: NURSE PRACTITIONER

## 2025-04-28 PROCEDURE — 1036F TOBACCO NON-USER: CPT | Performed by: NURSE PRACTITIONER

## 2025-04-28 PROCEDURE — 73030 X-RAY EXAM OF SHOULDER: CPT | Mod: LEFT SIDE | Performed by: NURSE PRACTITIONER

## 2025-04-28 RX ORDER — LIDOCAINE 50 MG/G
1 PATCH TOPICAL DAILY
Qty: 10 PATCH | Refills: 0 | Status: SHIPPED | OUTPATIENT
Start: 2025-04-28 | End: 2026-04-28

## 2025-04-28 RX ORDER — PREDNISONE 20 MG/1
40 TABLET ORAL DAILY
Qty: 10 TABLET | Refills: 0 | Status: SHIPPED | OUTPATIENT
Start: 2025-04-28 | End: 2025-05-03

## 2025-04-28 ASSESSMENT — ENCOUNTER SYMPTOMS
FACIAL ASYMMETRY: 0
SPEECH DIFFICULTY: 0
SEIZURES: 0
SHORTNESS OF BREATH: 0
VOMITING: 0
HEADACHES: 1
BACK PAIN: 0
LOSS OF CONSCIOUSNESS: 0
NECK PAIN: 0
DIZZINESS: 0
NAUSEA: 1
WEAKNESS: 0
ALTERED MENTAL STATUS: 0
OCCASIONAL FEELINGS OF UNSTEADINESS: 0
NUMBNESS: 0
ARTHRALGIAS: 1
ABDOMINAL PAIN: 0
CONTUSION: 0
LIGHT-HEADEDNESS: 0
LOSS OF SENSATION IN FEET: 0
DEPRESSION: 0
TREMORS: 0

## 2025-04-28 ASSESSMENT — PATIENT HEALTH QUESTIONNAIRE - PHQ9
SUM OF ALL RESPONSES TO PHQ9 QUESTIONS 1 AND 2: 0
2. FEELING DOWN, DEPRESSED OR HOPELESS: NOT AT ALL
1. LITTLE INTEREST OR PLEASURE IN DOING THINGS: NOT AT ALL

## 2025-04-28 ASSESSMENT — VISUAL ACUITY: OU: 1

## 2025-04-28 NOTE — PATIENT INSTRUCTIONS
Unremarkable xray of neck and left shoulder.  Stop ibuprofen while taking Medrol Dosepak. Resume ibuprofen after finishing Medrol Dosepak.  Take all medications as instructed.  Rest and cold compress as needed.  Pain should improve in 1-2 weeks.  Referred to orthopedist  If symptoms do not improve, advised to return and/or follow-up with PCP.  Call 911 or go to the nearest ER if the symptoms worsen.  Strict f/u.  Discussed supportive measures such as rest and decreasing screen time as needed for HA, etc.  Emergent signs and symptoms of concussion discussed. To call 911 or go to the nearest ER, if severe headache, visual disturbance, mental status change, inability to wake up, severe nausea and vomiting, neck pain etc. The list of the symptoms given to patient.   Return or follow-up with primary care provider if symptoms did not improve.  Patient verbalized understanding of the instructions. Pt left in stable condition.
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
## 2025-05-02 ENCOUNTER — APPOINTMENT (OUTPATIENT)
Dept: PRIMARY CARE | Facility: CLINIC | Age: 30
End: 2025-05-02
Payer: COMMERCIAL

## 2025-06-24 NOTE — PROGRESS NOTES
Love Alfonso is a 30 y.o. female with past medical history of  lactose intolerance who presents today for follow up of nausea and vomiting. Initially seen 10/2024 for several month history of nausea and vomiting. Frequently nauseated and vomiting 3-4 times per week. Changed diet but even water upsets stomach. Tried Gas-X, TUMS, PPI without relief. No weight loss. No diarrhea. No anemia. Taking NSAIDS about twice a week for migraines.     10/2024 Celiac serologies normal. Increased PPI to 40 mg daily.     1/2025 Completed 8 weeks omeprazole 40 mg daily with Compazine as needed (does not tolerate Zofran) without improvement. At its worst was vomiting every other day. Will come on suddenly. Some discomfort, heartburn, reflux, hoarse voice. Unable to tolerate lots of foods. Feels full, even with water. Taking tumeric, aloe juice. Stopped NSAIDS.      2/2025 US and HIDA scan with normal gallbladder, but found to have liver lesion. MRI liver with focal nodular hyperplasia versus adenoma. Repeat 6 months and referred to Hepatology. Gastric emptying study without gastroparesis.      2/2025 EGD normal. No H. Pylori or celiac disease.     4/2025 She still has nausea and vomiting episodes a few days per week. No pain and reflux improved so stopped PPI. Has kept food diary, tried different diets and nothing helps. Appears random. She stopped her oral contraceptive 2 months ago to see if this was the cause but no change. Able to eat normally. Takes sertaline. Does not think correlated as she has been on this for 4 years. No diarrhea. Taking Miralax daily with BM every other day or every 2 days. Does not feel constipated.    Presents today for follow up.started amitriptyline 10 mg nightly with *** improvement.     Social history: Works at [employer]. Never tobacco. Denies alcohol and illicit drugs. Never marijuana.     Family history: Denies family history of colon cancer or other GI disorders or malignancy.     Current  Medications[1]      Review of Systems  Review of Systems negative except as noted in HPI.    Objective     There were no vitals taken for this visit.     Physical Exam  Constitutional:  No acute distress. Normal appearance. Not ill-appearing.  HENT:  Head normocephalic and atraumatic. Conjunctivae normal.  Cardiovascular:  Normal rate. Regular rhythm.  Pulmonary:  Pulmonary effort normal. No respiratory distress. Breath sounds clear.  Abdominal:  Abdomen is flat and soft. There is no distension. No tenderness or guarding.  Skin: Dry.  Neurological:  Alert and oriented.  Psychiatric:  Mood and affect normal.    Assessment/Plan     30 y.o. female who presents today for clinic follow up of nausea and vomiting episodes that occur a few times per week. Thus far labs, US, HIDA scan, gastric emptying study, and EGD have been unrevealing. She continues to have nausea and vomiting despite several months PPI therapy, Compazine prn (does not tolerate Zofran). No dizziness, does not think she has POTS. Offered MRI brain, this was declined today. Will trial amitriptyline for possible cyclic vomiting syndrome, functional dyspepsia.     Liver lesion - Repeat MRI 6 months, refer to hepatology.     Recommendations  Start trial of amitriptyline 10 mg nightly for possible cyclical vomiting syndrome, functional dyspepsia. We discussed possible serotonin reaction with her sertraline, however amitriptyline dose very low. Advised to notify other provider that she is on both medications.  Repeat MRI 6 months. Follow up with Hepatology.  Follow up 3 months.    Electronically signed by: Love Woodall CNP on 6/24/2025 at 3:17 PM           [1]   Current Outpatient Medications   Medication Sig Dispense Refill    amitriptyline (Elavil) 10 mg tablet Take 1 tablet (10 mg) by mouth once daily at bedtime. 90 tablet 3    cetirizine (ZyrTEC) 10 mg tablet Take 1 tablet (10 mg) by mouth once daily.      hydrocortisone 2.5 % ointment if needed.       lidocaine (Lidoderm) 5 % patch Place 1 patch over 12 hours on the skin once daily. Apply to painful area 12 hours per day, remove for 12 hours. 10 patch 0    polyethylene glycol (Miralax) 17 gram/dose powder Take by mouth once daily.      sertraline (Zoloft) 50 mg tablet Take 2.5 tablets (125 mg) by mouth once daily.       No current facility-administered medications for this visit.

## 2025-07-10 ENCOUNTER — APPOINTMENT (OUTPATIENT)
Dept: GASTROENTEROLOGY | Facility: CLINIC | Age: 30
End: 2025-07-10
Payer: COMMERCIAL

## 2025-09-08 ENCOUNTER — APPOINTMENT (OUTPATIENT)
Dept: GASTROENTEROLOGY | Facility: CLINIC | Age: 30
End: 2025-09-08
Payer: COMMERCIAL